# Patient Record
Sex: MALE | NOT HISPANIC OR LATINO | Employment: FULL TIME | URBAN - METROPOLITAN AREA
[De-identification: names, ages, dates, MRNs, and addresses within clinical notes are randomized per-mention and may not be internally consistent; named-entity substitution may affect disease eponyms.]

---

## 2022-07-26 ENCOUNTER — OFFICE VISIT (OUTPATIENT)
Dept: FAMILY MEDICINE | Facility: CLINIC | Age: 32
End: 2022-07-26
Payer: COMMERCIAL

## 2022-07-26 VITALS
WEIGHT: 204.4 LBS | DIASTOLIC BLOOD PRESSURE: 72 MMHG | HEIGHT: 69 IN | SYSTOLIC BLOOD PRESSURE: 110 MMHG | RESPIRATION RATE: 18 BRPM | HEART RATE: 70 BPM | BODY MASS INDEX: 30.27 KG/M2

## 2022-07-26 DIAGNOSIS — Z13.6 CARDIOVASCULAR SCREENING; LDL GOAL LESS THAN 130: ICD-10-CM

## 2022-07-26 DIAGNOSIS — Z82.71 FAMILY HISTORY OF POLYCYSTIC KIDNEY DISEASE: ICD-10-CM

## 2022-07-26 DIAGNOSIS — Z11.3 SCREEN FOR STD (SEXUALLY TRANSMITTED DISEASE): ICD-10-CM

## 2022-07-26 DIAGNOSIS — F17.200 NICOTINE DEPENDENCE, UNCOMPLICATED, UNSPECIFIED NICOTINE PRODUCT TYPE: ICD-10-CM

## 2022-07-26 DIAGNOSIS — Z11.59 NEED FOR HEPATITIS C SCREENING TEST: ICD-10-CM

## 2022-07-26 DIAGNOSIS — Z11.4 SCREENING FOR HIV (HUMAN IMMUNODEFICIENCY VIRUS): ICD-10-CM

## 2022-07-26 DIAGNOSIS — Z00.00 ROUTINE GENERAL MEDICAL EXAMINATION AT A HEALTH CARE FACILITY: Primary | ICD-10-CM

## 2022-07-26 LAB
ANION GAP SERPL CALCULATED.3IONS-SCNC: 5 MMOL/L (ref 3–14)
BUN SERPL-MCNC: 11 MG/DL (ref 7–30)
CALCIUM SERPL-MCNC: 9.1 MG/DL (ref 8.5–10.1)
CHLORIDE BLD-SCNC: 105 MMOL/L (ref 94–109)
CHOLEST SERPL-MCNC: 181 MG/DL
CO2 SERPL-SCNC: 29 MMOL/L (ref 20–32)
CREAT SERPL-MCNC: 0.98 MG/DL (ref 0.66–1.25)
FASTING STATUS PATIENT QL REPORTED: NO
GFR SERPL CREATININE-BSD FRML MDRD: >90 ML/MIN/1.73M2
GLUCOSE BLD-MCNC: 84 MG/DL (ref 70–99)
HDLC SERPL-MCNC: 57 MG/DL
LDLC SERPL CALC-MCNC: 77 MG/DL
NONHDLC SERPL-MCNC: 124 MG/DL
POTASSIUM BLD-SCNC: 4 MMOL/L (ref 3.4–5.3)
SODIUM SERPL-SCNC: 139 MMOL/L (ref 133–144)
TRIGL SERPL-MCNC: 236 MG/DL

## 2022-07-26 PROCEDURE — 80048 BASIC METABOLIC PNL TOTAL CA: CPT | Performed by: PHYSICIAN ASSISTANT

## 2022-07-26 PROCEDURE — 86780 TREPONEMA PALLIDUM: CPT | Performed by: PHYSICIAN ASSISTANT

## 2022-07-26 PROCEDURE — 87591 N.GONORRHOEAE DNA AMP PROB: CPT | Performed by: PHYSICIAN ASSISTANT

## 2022-07-26 PROCEDURE — 36415 COLL VENOUS BLD VENIPUNCTURE: CPT | Performed by: PHYSICIAN ASSISTANT

## 2022-07-26 PROCEDURE — 86803 HEPATITIS C AB TEST: CPT | Performed by: PHYSICIAN ASSISTANT

## 2022-07-26 PROCEDURE — 87389 HIV-1 AG W/HIV-1&-2 AB AG IA: CPT | Performed by: PHYSICIAN ASSISTANT

## 2022-07-26 PROCEDURE — 99406 BEHAV CHNG SMOKING 3-10 MIN: CPT | Performed by: PHYSICIAN ASSISTANT

## 2022-07-26 PROCEDURE — 80061 LIPID PANEL: CPT | Performed by: PHYSICIAN ASSISTANT

## 2022-07-26 PROCEDURE — 99385 PREV VISIT NEW AGE 18-39: CPT | Performed by: PHYSICIAN ASSISTANT

## 2022-07-26 PROCEDURE — 87491 CHLMYD TRACH DNA AMP PROBE: CPT | Performed by: PHYSICIAN ASSISTANT

## 2022-07-26 RX ORDER — POLYETHYLENE GLYCOL 3350 17 G
POWDER IN PACKET (EA) ORAL
Qty: 108 LOZENGE | Refills: 5 | Status: SHIPPED | OUTPATIENT
Start: 2022-07-26 | End: 2024-04-10

## 2022-07-26 RX ORDER — NICOTINE 21 MG/24HR
1 PATCH, TRANSDERMAL 24 HOURS TRANSDERMAL EVERY 24 HOURS
Qty: 42 PATCH | Refills: 1 | Status: SHIPPED | OUTPATIENT
Start: 2022-07-26 | End: 2022-09-06

## 2022-07-26 ASSESSMENT — ENCOUNTER SYMPTOMS
COUGH: 0
ARTHRALGIAS: 1
FREQUENCY: 0
EYE PAIN: 0
NERVOUS/ANXIOUS: 1
HEMATURIA: 0
CONSTIPATION: 0
HEADACHES: 0
FEVER: 0
PALPITATIONS: 0
JOINT SWELLING: 0
SHORTNESS OF BREATH: 0
PARESTHESIAS: 0
HEMATOCHEZIA: 0
SORE THROAT: 0
HEARTBURN: 0
WEAKNESS: 1
DYSURIA: 0
DIARRHEA: 1
CHILLS: 0
MYALGIAS: 1
ABDOMINAL PAIN: 0
NAUSEA: 0
DIZZINESS: 0

## 2022-07-26 ASSESSMENT — PAIN SCALES - GENERAL: PAINLEVEL: MILD PAIN (3)

## 2022-07-26 NOTE — PROGRESS NOTES
SUBJECTIVE:   CC: Que Justin is an 32 year old male who presents for preventative health visit.     Patient has been advised of split billing requirements and indicates understanding: Yes     Healthy Habits:     Getting at least 3 servings of Calcium per day:  Yes    Bi-annual eye exam:  Yes    Dental care twice a year:  Yes    Sleep apnea or symptoms of sleep apnea:  Daytime drowsiness and Excessive snoring    Diet:  Regular (no restrictions)    Frequency of exercise:  4-5 days/week    Duration of exercise:  15-30 minutes    Taking medications regularly:  Yes    Medication side effects:  None    PHQ-2 Total Score: 2    Additional concerns today:  Yes    Today's PHQ-2 Score:   PHQ-2 ( 1999 [employer]) 7/26/2022   Q1: Little interest or pleasure in doing things 1   Q2: Feeling down, depressed or hopeless 1   PHQ-2 Score 2   Q1: Little interest or pleasure in doing things Several days   Q2: Feeling down, depressed or hopeless Several days   PHQ-2 Score 2       Abuse: Current or Past(Physical, Sexual or Emotional)- No  Do you feel safe in your environment? Yes    Have you ever done Advance Care Planning? (For example, a Health Directive, POLST, or a discussion with a medical provider or your loved ones about your wishes): No, advance care planning information given to patient to review.  Patient plans to discuss their wishes with loved ones or provider.      Social History     Tobacco Use     Smoking status: Current Every Day Smoker     Types: Cigarettes     Smokeless tobacco: Current User     Types: Chew   Substance Use Topics     Alcohol use: Yes     If you drink alcohol do you typically have >3 drinks per day or >7 drinks per week? No    Alcohol Use 7/26/2022   Prescreen: >3 drinks/day or >7 drinks/week? No   Prescreen: >3 drinks/day or >7 drinks/week? -   No flowsheet data found.    Last PSA: No results found for: PSA    Reviewed orders with patient. Reviewed health maintenance and updated orders accordingly -  "Yes  Lab work is in process    Reviewed and updated as needed this visit by clinical staff   Tobacco  Allergies  Meds  Problems  Med Hx  Surg Hx  Fam Hx  Soc   Hx          Reviewed and updated as needed this visit by Provider   Tobacco  Allergies  Meds  Problems  Med Hx  Surg Hx  Fam Hx             Review of Systems   Constitutional: Negative for chills and fever.   HENT: Negative for congestion, ear pain, hearing loss and sore throat.    Eyes: Negative for pain and visual disturbance.   Respiratory: Negative for cough and shortness of breath.    Cardiovascular: Negative for chest pain, palpitations and peripheral edema.   Gastrointestinal: Positive for diarrhea. Negative for abdominal pain, constipation, heartburn, hematochezia and nausea.   Genitourinary: Negative for dysuria, frequency, genital sores, hematuria, impotence, penile discharge and urgency.   Musculoskeletal: Positive for arthralgias and myalgias. Negative for joint swelling.   Skin: Negative for rash.   Neurological: Positive for weakness. Negative for dizziness, headaches and paresthesias.   Psychiatric/Behavioral: Negative for mood changes. The patient is nervous/anxious.      OBJECTIVE:   /72 (BP Location: Right arm, Patient Position: Sitting, Cuff Size: Adult Large)   Pulse 70   Resp 18   Ht 1.753 m (5' 9\")   Wt 92.7 kg (204 lb 6.4 oz)   BMI 30.18 kg/m      Physical Exam  Constitutional: healthy, alert, and no distress  Head: Normocephalic. Atraumatic  Eyes: No conjunctival injection, sclera anicteric  Neck: supple, no thyromegaly, nodules or asymmetry of the thyroid. No cervical LAD.  Cardiovascular: RRR. No murmurs, clicks, gallops, or rubs. No peripheral edema.   Respiratory: No resp distress. Lungs CTAB bilaterally.   Musculoskeletal: extremities normal- no gross deformities noted, and normal muscle tone  Skin: no suspicious lesions or rashes  Neurologic: Gait normal. CN 2-12 grossly intact  Psychiatric: mentation " "appears normal and affect normal/bright     ASSESSMENT/PLAN:   Routine general medical examination at a health care facility  32 yr old here for physical exam. Discussed preventative screenings which are updated below. Counseled on immunizations and healthy lifestyle. Follow-up in 1 year for repeat physical exam.    Nicotine dependence, uncomplicated, unspecified nicotine product type  Would like to stop smoking. Counseled. Will trial Nicotine replacement. Follow-up in 3 months for recheck.   - nicotine (NICODERM CQ) 14 MG/24HR 24 hr patch; Place 1 patch onto the skin every 24 hours for 42 days  - nicotine (NICODERM CQ) 7 MG/24HR 24 hr patch; Place 1 patch onto the skin every 24 hours for 14 days  - nicotine (NICORETTE) 2 MG gum; How to take it: When the urge to smoke occurs, chew gum until you feel a tingle, then \"park\" the gum in your cheek until the tingle is gone. Re-chew every few minutes and \"park\" again, chewing one piece for 30 minutes. Do not eat or drink while chewing.  Follow this schedule: Weeks 1 to 6: One piece of gum every 1 to 2 hours. Use at least 9 pieces a day, but no more than 24. Weeks 7 to 9: One piece of gum every 2 to 4 hours. Weeks 10 to 12: One piece of gum every 4 to 8 hours.  - nicotine (COMMIT) 2 MG lozenge; How to take it: When the urge to smoke occurs, suck (do not chew) on 1 lozenge to release nicotine. Do not eat or drink while the lozenge is in your mouth. Follow this schedule: Weeks 1 to 6: One lozenge every 1 to 2 hours. Use at least 9 lozenges a day, but no more than 20. Weeks 7 to 9: One lozenge every 2 to 4 hours. Weeks 10 to 12: One lozenge every 4 to 8 hours  - SMOKING CESSATION COUNSELING 3-10 MIN    CARDIOVASCULAR SCREENING; LDL GOAL LESS THAN 130  Due for screening. Ordered today.   - Lipid panel reflex to direct LDL Fasting; Future    Screen for STD (sexually transmitted disease)  Requested screening. Ordered today.   - NEISSERIA GONORRHOEA PCR  - CHLAMYDIA TRACHOMATIS " "PCR  - Treponema Abs w Reflex to RPR and Titer; Future    Screening for HIV (human immunodeficiency virus)  Due for screening. Ordered today.   - HIV Antigen Antibody Combo; Future    Need for hepatitis C screening test  Due for screening. Ordered today.   - Hepatitis C Screen Reflex to HCV RNA Quant and Genotype; Future    Family history of polycystic kidney disease  No hypertension here. Will check BMP today. Consider ultrasound in the future.  - Basic metabolic panel  (Ca, Cl, CO2, Creat, Gluc, K, Na, BUN); Future    Patient has been advised of split billing requirements and indicates understanding: Yes    COUNSELING:   Reviewed preventive health counseling, as reflected in patient instructions       Regular exercise       Healthy diet/nutrition       Vision screening    Estimated body mass index is 30.18 kg/m  as calculated from the following:    Height as of this encounter: 1.753 m (5' 9\").    Weight as of this encounter: 92.7 kg (204 lb 6.4 oz).     Weight management plan: Discussed healthy diet and exercise guidelines     He reports that he has been smoking cigarettes. His smokeless tobacco use includes chew.    COLEMAN Avendaño [facility]  "

## 2022-07-27 LAB
C TRACH DNA SPEC QL NAA+PROBE: NEGATIVE
HCV AB SERPL QL IA: NONREACTIVE
HIV 1+2 AB+HIV1 P24 AG SERPL QL IA: NONREACTIVE
N GONORRHOEA DNA SPEC QL NAA+PROBE: NEGATIVE
T PALLIDUM AB SER QL: NONREACTIVE

## 2022-12-14 ENCOUNTER — OFFICE VISIT (OUTPATIENT)
Dept: FAMILY MEDICINE | Facility: CLINIC | Age: 32
End: 2022-12-14
Payer: COMMERCIAL

## 2022-12-14 VITALS
OXYGEN SATURATION: 97 % | HEART RATE: 67 BPM | SYSTOLIC BLOOD PRESSURE: 112 MMHG | BODY MASS INDEX: 32.11 KG/M2 | RESPIRATION RATE: 16 BRPM | DIASTOLIC BLOOD PRESSURE: 82 MMHG | WEIGHT: 216.8 LBS | TEMPERATURE: 97.5 F | HEIGHT: 69 IN

## 2022-12-14 DIAGNOSIS — M41.9 SCOLIOSIS OF LUMBAR SPINE, UNSPECIFIED SCOLIOSIS TYPE: ICD-10-CM

## 2022-12-14 DIAGNOSIS — R10.9 FLANK PAIN: ICD-10-CM

## 2022-12-14 DIAGNOSIS — Z00.00 ANNUAL PHYSICAL EXAM: Primary | ICD-10-CM

## 2022-12-14 DIAGNOSIS — Z82.71 FAMILY HISTORY OF POLYCYSTIC KIDNEY DISEASE: ICD-10-CM

## 2022-12-14 DIAGNOSIS — R06.83 SNORING: ICD-10-CM

## 2022-12-14 DIAGNOSIS — M54.50 CHRONIC BILATERAL LOW BACK PAIN WITHOUT SCIATICA: ICD-10-CM

## 2022-12-14 DIAGNOSIS — N50.89 TESTICULAR LUMP: ICD-10-CM

## 2022-12-14 DIAGNOSIS — Z86.59 HISTORY OF ADHD: ICD-10-CM

## 2022-12-14 DIAGNOSIS — G89.29 CHRONIC BILATERAL LOW BACK PAIN WITHOUT SCIATICA: ICD-10-CM

## 2022-12-14 LAB
ALBUMIN UR-MCNC: NEGATIVE MG/DL
ANION GAP SERPL CALCULATED.3IONS-SCNC: 12 MMOL/L (ref 7–15)
APPEARANCE UR: CLEAR
BACTERIA #/AREA URNS HPF: ABNORMAL /HPF
BILIRUB UR QL STRIP: NEGATIVE
BUN SERPL-MCNC: 13.4 MG/DL (ref 6–20)
CALCIUM SERPL-MCNC: 9.6 MG/DL (ref 8.6–10)
CHLORIDE SERPL-SCNC: 102 MMOL/L (ref 98–107)
CHOLEST SERPL-MCNC: 203 MG/DL
COLOR UR AUTO: YELLOW
CREAT SERPL-MCNC: 1.01 MG/DL (ref 0.67–1.17)
CREAT UR-MCNC: 139.3 MG/DL
DEPRECATED HCO3 PLAS-SCNC: 26 MMOL/L (ref 22–29)
ERYTHROCYTE [DISTWIDTH] IN BLOOD BY AUTOMATED COUNT: 11.6 % (ref 10–15)
GFR SERPL CREATININE-BSD FRML MDRD: >90 ML/MIN/1.73M2
GLUCOSE SERPL-MCNC: 86 MG/DL (ref 70–99)
GLUCOSE UR STRIP-MCNC: NEGATIVE MG/DL
HCT VFR BLD AUTO: 46.8 % (ref 40–53)
HDLC SERPL-MCNC: 73 MG/DL
HGB BLD-MCNC: 15.7 G/DL (ref 13.3–17.7)
HGB UR QL STRIP: NEGATIVE
KETONES UR STRIP-MCNC: NEGATIVE MG/DL
LDLC SERPL CALC-MCNC: 110 MG/DL
LEUKOCYTE ESTERASE UR QL STRIP: NEGATIVE
MCH RBC QN AUTO: 31.4 PG (ref 26.5–33)
MCHC RBC AUTO-ENTMCNC: 33.5 G/DL (ref 31.5–36.5)
MCV RBC AUTO: 94 FL (ref 78–100)
MICROALBUMIN UR-MCNC: <12 MG/L
MICROALBUMIN/CREAT UR: NORMAL MG/G{CREAT}
MUCOUS THREADS #/AREA URNS LPF: PRESENT /LPF
NITRATE UR QL: NEGATIVE
NONHDLC SERPL-MCNC: 130 MG/DL
PH UR STRIP: 6 [PH] (ref 5–7)
PLATELET # BLD AUTO: 203 10E3/UL (ref 150–450)
POTASSIUM SERPL-SCNC: 4.3 MMOL/L (ref 3.4–5.3)
RBC # BLD AUTO: 5 10E6/UL (ref 4.4–5.9)
RBC #/AREA URNS AUTO: ABNORMAL /HPF
SODIUM SERPL-SCNC: 140 MMOL/L (ref 136–145)
SP GR UR STRIP: 1.02 (ref 1–1.03)
SQUAMOUS #/AREA URNS AUTO: ABNORMAL /LPF
TRIGL SERPL-MCNC: 102 MG/DL
UROBILINOGEN UR STRIP-ACNC: 0.2 E.U./DL
WBC # BLD AUTO: 7.1 10E3/UL (ref 4–11)
WBC #/AREA URNS AUTO: ABNORMAL /HPF

## 2022-12-14 PROCEDURE — 85027 COMPLETE CBC AUTOMATED: CPT | Performed by: NURSE PRACTITIONER

## 2022-12-14 PROCEDURE — 81001 URINALYSIS AUTO W/SCOPE: CPT | Performed by: NURSE PRACTITIONER

## 2022-12-14 PROCEDURE — 36415 COLL VENOUS BLD VENIPUNCTURE: CPT | Performed by: NURSE PRACTITIONER

## 2022-12-14 PROCEDURE — 80061 LIPID PANEL: CPT | Performed by: NURSE PRACTITIONER

## 2022-12-14 PROCEDURE — 99214 OFFICE O/P EST MOD 30 MIN: CPT | Mod: 25 | Performed by: NURSE PRACTITIONER

## 2022-12-14 PROCEDURE — 82043 UR ALBUMIN QUANTITATIVE: CPT | Performed by: NURSE PRACTITIONER

## 2022-12-14 PROCEDURE — 80048 BASIC METABOLIC PNL TOTAL CA: CPT | Performed by: NURSE PRACTITIONER

## 2022-12-14 PROCEDURE — 99395 PREV VISIT EST AGE 18-39: CPT | Performed by: NURSE PRACTITIONER

## 2022-12-14 RX ORDER — CYCLOBENZAPRINE HCL 5 MG
5 TABLET ORAL 3 TIMES DAILY PRN
Qty: 30 TABLET | Refills: 3 | Status: SHIPPED | OUTPATIENT
Start: 2022-12-14 | End: 2024-02-26

## 2022-12-14 ASSESSMENT — PAIN SCALES - GENERAL: PAINLEVEL: NO PAIN (0)

## 2022-12-14 NOTE — LETTER
December 15, 2022      Que Justin  [address] [facility] [address]        Dear ,    We are writing to inform you of your test results.      1. Urine protein level normal   2. Urine is normal and negative for kidney infection   3. Kidney function, electrolytes and glucose level all normal   4. Blood count normal   5. Cholesterol level was mildly elevated, recommend to follow low fat diet and try to exercise daily.     Resulted Orders   Basic metabolic panel  (Ca, Cl, CO2, Creat, Gluc, K, Na, BUN)   Result Value Ref Range    Sodium 140 136 - 145 mmol/L    Potassium 4.3 3.4 - 5.3 mmol/L    Chloride 102 98 - 107 mmol/L    Carbon Dioxide (CO2) 26 22 - 29 mmol/L    Anion Gap 12 7 - 15 mmol/L    Urea Nitrogen 13.4 6.0 - 20.0 mg/dL    Creatinine 1.01 0.67 - 1.17 mg/dL    Calcium 9.6 8.6 - 10.0 mg/dL    Glucose 86 70 - 99 mg/dL    GFR Estimate >90 >60 mL/min/1.73m2      Comment:      Effective December 21, 2021 eGFRcr in adults is calculated using the 2021 CKD-EPI creatinine equation which includes age and gender (Martin et al., NEJM, DOI: 10.1056/HCLWyc0402580)   Albumin Random Urine Quantitative with Creat Ratio   Result Value Ref Range    Albumin Urine mg/L <12.0 mg/L      Comment:      The reference ranges have not been established in urine albumin. The results should be integrated into the clinical context for interpretation.    Albumin Urine mg/g Cr        Comment:      Unable to calculate, urine albumin and/or urine creatinine is outside detectable limits.  Microalbuminuria is defined as an albumin:creatinine ratio of 17 to 299 for males and 25 to 299 for females. A ratio of albumin:creatinine of 300 or higher is indicative of overt proteinuria.  Due to biologic variability, positive results should be confirmed by a second, first-morning random or 24-hour timed urine specimen. If there is discrepancy, a third specimen is recommended. When 2 out of 3 results are in the microalbuminuria range, this is  evidence for incipient nephropathy and warrants increased efforts at glucose control, blood pressure control, and institution of therapy with an angiotensin-converting-enzyme (ACE) inhibitor (if the patient can tolerate it).      Creatinine Urine mg/dL 139.3 mg/dL      Comment:      The reference ranges have not been established in urine creatinine. The results should be integrated into the clinical context for interpretation.   UA with Microscopic reflex to Culture - lab collect   Result Value Ref Range    Color Urine Yellow Colorless, Straw, Light Yellow, Yellow    Appearance Urine Clear Clear    Glucose Urine Negative Negative mg/dL    Bilirubin Urine Negative Negative    Ketones Urine Negative Negative mg/dL    Specific Gravity Urine 1.025 1.003 - 1.035    Blood Urine Negative Negative    pH Urine 6.0 5.0 - 7.0    Protein Albumin Urine Negative Negative mg/dL    Urobilinogen Urine 0.2 0.2, 1.0 E.U./dL    Nitrite Urine Negative Negative    Leukocyte Esterase Urine Negative Negative   CBC with platelets   Result Value Ref Range    WBC Count 7.1 4.0 - 11.0 10e3/uL    RBC Count 5.00 4.40 - 5.90 10e6/uL    Hemoglobin 15.7 13.3 - 17.7 g/dL    Hematocrit 46.8 40.0 - 53.0 %    MCV 94 78 - 100 fL    MCH 31.4 26.5 - 33.0 pg    MCHC 33.5 31.5 - 36.5 g/dL    RDW 11.6 10.0 - 15.0 %    Platelet Count 203 150 - 450 10e3/uL   Lipid panel reflex to direct LDL Fasting   Result Value Ref Range    Cholesterol 203 (H) <200 mg/dL    Triglycerides 102 <150 mg/dL    Direct Measure HDL 73 >=40 mg/dL    LDL Cholesterol Calculated 110 (H) <=100 mg/dL    Non HDL Cholesterol 130 (H) <130 mg/dL    Narrative    Cholesterol  Desirable:  <200 mg/dL    Triglycerides  Normal:  Less than 150 mg/dL  Borderline High:  150-199 mg/dL  High:  200-499 mg/dL  Very High:  Greater than or equal to 500 mg/dL    Direct Measure HDL  Female:  Greater than or equal to 50 mg/dL   Male:  Greater than or equal to 40 mg/dL    LDL Cholesterol  Desirable:   <100mg/dL  Above Desirable:  100-129 mg/dL   Borderline High:  130-159 mg/dL   High:  160-189 mg/dL   Very High:  >= 190 mg/dL    Non HDL Cholesterol  Desirable:  130 mg/dL  Above Desirable:  130-159 mg/dL  Borderline High:  160-189 mg/dL  High:  190-219 mg/dL  Very High:  Greater than or equal to 220 mg/dL   Urine Microscopic   Result Value Ref Range    Bacteria Urine Few (A) None Seen /HPF    RBC Urine 0-2 0-2 /HPF /HPF    WBC Urine 0-5 0-5 /HPF /HPF    Squamous Epithelials Urine Few (A) None Seen /LPF    Mucus Urine Present (A) None Seen /LPF    Narrative    Urine Culture not indicated       If you have any questions or concerns, please call the clinic at the number listed above.       Sincerely,      LOY Telles CNP
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
## 2022-12-17 ENCOUNTER — HOSPITAL ENCOUNTER (OUTPATIENT)
Dept: ULTRASOUND IMAGING | Facility: CLINIC | Age: 32
Discharge: HOME | End: 2022-12-17
Attending: NURSE PRACTITIONER
Payer: COMMERCIAL

## 2022-12-17 DIAGNOSIS — R10.9 FLANK PAIN: ICD-10-CM

## 2022-12-17 DIAGNOSIS — Z82.71 FAMILY HISTORY OF POLYCYSTIC KIDNEY DISEASE: ICD-10-CM

## 2022-12-17 DIAGNOSIS — N50.89 TESTICULAR LUMP: ICD-10-CM

## 2022-12-17 PROCEDURE — 76770 US EXAM ABDO BACK WALL COMP: CPT

## 2022-12-17 PROCEDURE — 76870 US EXAM SCROTUM: CPT

## 2023-01-09 ENCOUNTER — HOSPITAL ENCOUNTER (OUTPATIENT)
Dept: PHYSICAL THERAPY | Facility: CLINIC | Age: 33
Setting detail: THERAPIES SERIES
Discharge: HOME | End: 2023-01-09
Attending: NURSE PRACTITIONER
Payer: COMMERCIAL

## 2023-01-09 DIAGNOSIS — M54.50 CHRONIC BILATERAL LOW BACK PAIN WITHOUT SCIATICA: ICD-10-CM

## 2023-01-09 DIAGNOSIS — M41.9 SCOLIOSIS OF LUMBAR SPINE, UNSPECIFIED SCOLIOSIS TYPE: ICD-10-CM

## 2023-01-09 DIAGNOSIS — G89.29 CHRONIC BILATERAL LOW BACK PAIN WITHOUT SCIATICA: ICD-10-CM

## 2023-01-09 PROCEDURE — 97161 PT EVAL LOW COMPLEX 20 MIN: CPT | Mod: GP | Performed by: PHYSICAL THERAPIST

## 2023-01-09 PROCEDURE — 97530 THERAPEUTIC ACTIVITIES: CPT | Mod: GP | Performed by: PHYSICAL THERAPIST

## 2023-01-09 PROCEDURE — 97110 THERAPEUTIC EXERCISES: CPT | Mod: GP | Performed by: PHYSICAL THERAPIST
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
## 2023-02-12 ENCOUNTER — HEALTH MAINTENANCE LETTER (OUTPATIENT)
Age: 33
End: 2023-02-12

## 2023-03-29 NOTE — PROGRESS NOTES
Physical Therapy Discharge Summary    Que Justin has completed the ordered physical therapy evaluation. Treatment recommendations were made, but pt has not returned for any further recommended PT sessions past the initial evaluation.  Pt was unable to be re-assessed, and present status is unknown.    Please contact me with any questions or concerns.  Thank you for your referral.    Tena Montalvo  PT, DPT       3/29/2023   [address] [address]   [email or web address]  Voicemail: [phone]

## 2023-05-03 ASSESSMENT — ANXIETY QUESTIONNAIRES
7. FEELING AFRAID AS IF SOMETHING AWFUL MIGHT HAPPEN: MORE THAN HALF THE DAYS
GAD7 TOTAL SCORE: 14
4. TROUBLE RELAXING: MORE THAN HALF THE DAYS
2. NOT BEING ABLE TO STOP OR CONTROL WORRYING: MORE THAN HALF THE DAYS
7. FEELING AFRAID AS IF SOMETHING AWFUL MIGHT HAPPEN: MORE THAN HALF THE DAYS
3. WORRYING TOO MUCH ABOUT DIFFERENT THINGS: MORE THAN HALF THE DAYS
1. FEELING NERVOUS, ANXIOUS, OR ON EDGE: MORE THAN HALF THE DAYS
6. BECOMING EASILY ANNOYED OR IRRITABLE: MORE THAN HALF THE DAYS
GAD7 TOTAL SCORE: 14
GAD7 TOTAL SCORE: 14
8. IF YOU CHECKED OFF ANY PROBLEMS, HOW DIFFICULT HAVE THESE MADE IT FOR YOU TO DO YOUR WORK, TAKE CARE OF THINGS AT HOME, OR GET ALONG WITH OTHER PEOPLE?: NOT DIFFICULT AT ALL
5. BEING SO RESTLESS THAT IT IS HARD TO SIT STILL: MORE THAN HALF THE DAYS
IF YOU CHECKED OFF ANY PROBLEMS ON THIS QUESTIONNAIRE, HOW DIFFICULT HAVE THESE PROBLEMS MADE IT FOR YOU TO DO YOUR WORK, TAKE CARE OF THINGS AT HOME, OR GET ALONG WITH OTHER PEOPLE: NOT DIFFICULT AT ALL

## 2023-05-03 ASSESSMENT — PATIENT HEALTH QUESTIONNAIRE - PHQ9
SUM OF ALL RESPONSES TO PHQ QUESTIONS 1-9: 8
10. IF YOU CHECKED OFF ANY PROBLEMS, HOW DIFFICULT HAVE THESE PROBLEMS MADE IT FOR YOU TO DO YOUR WORK, TAKE CARE OF THINGS AT HOME, OR GET ALONG WITH OTHER PEOPLE: SOMEWHAT DIFFICULT
SUM OF ALL RESPONSES TO PHQ QUESTIONS 1-9: 8

## 2023-05-04 ENCOUNTER — VIRTUAL VISIT (OUTPATIENT)
Dept: PSYCHOLOGY | Facility: CLINIC | Age: 33
End: 2023-05-04
Payer: COMMERCIAL

## 2023-05-04 DIAGNOSIS — F41.1 GENERALIZED ANXIETY DISORDER: Primary | ICD-10-CM

## 2023-05-04 PROCEDURE — 90791 PSYCH DIAGNOSTIC EVALUATION: CPT | Mod: VID | Performed by: PSYCHOLOGIST

## 2023-05-04 ASSESSMENT — COLUMBIA-SUICIDE SEVERITY RATING SCALE - C-SSRS
1. HAVE YOU WISHED YOU WERE DEAD OR WISHED YOU COULD GO TO SLEEP AND NOT WAKE UP?: NO
6. HAVE YOU EVER DONE ANYTHING, STARTED TO DO ANYTHING, OR PREPARED TO DO ANYTHING TO END YOUR LIFE?: NO
ATTEMPT LIFETIME: NO
TOTAL  NUMBER OF ABORTED OR SELF INTERRUPTED ATTEMPTS LIFETIME: NO
2. HAVE YOU ACTUALLY HAD ANY THOUGHTS OF KILLING YOURSELF?: NO
TOTAL  NUMBER OF INTERRUPTED ATTEMPTS LIFETIME: NO
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
## 2023-05-12 ENCOUNTER — VIRTUAL VISIT (OUTPATIENT)
Dept: PSYCHOLOGY | Facility: CLINIC | Age: 33
End: 2023-05-12
Payer: COMMERCIAL

## 2023-05-12 DIAGNOSIS — F41.1 GENERALIZED ANXIETY DISORDER: Primary | ICD-10-CM

## 2023-05-12 PROCEDURE — 90832 PSYTX W PT 30 MINUTES: CPT | Mod: VID | Performed by: PSYCHOLOGIST

## 2023-05-12 NOTE — PROGRESS NOTES
Progress Note     Client Name:  Que Justin Date: 5/12/2023         Service Type: Individual    Telemedicine Visit: The patient's condition can be safely assessed and treated via synchronous audio and visual telemedicine encounter.      Reason for Telemedicine Visit: Services only offered telehealth    Originating Site (Patient Location): Patient's place of employment        Distant Location (provider location):  Off-site    Consent:  The patient/guardian has verbally consented to: the potential risks and benefits of telemedicine (video visit) versus in person care; bill my insurance or make self-payment for services provided; and responsibility for payment of non-covered services.     Mode of Communication:  Video Conference via Digital Lab    As the provider I attest to compliance with applicable laws and regulations related to telemedicine.         Session Start Time: 1:00  Session End Time: 1:20     Session Length: 20 minutes    Session #: 2     Attendees: Client attended alone     Intervention: reviewed strategies for managing anxiety; motivational interviewing: explored potential barriers for making healthy changes    Identifying Information:  Client is a 33 year old, , partnered / significant other male. Client was referred for a diagnostic assessment by PCP.  The purpose of this evaluation is to: provide treatment recommendations and clarify diagnosis.  Client is currently employed full time and reports he is able to function appropriately at work.. Client attended the session alone.       Client's Statement of Presenting Concern:  Client reported seeking services at this time for diagnostic assessment and recommendations for treatment. Client's presenting concerns include: He is fidgety and bites his nails. He can't stay focused in conversations. He is pretty organized. He can be forgetful so he has to make lists. He has to ask people to repeat themselves. It can be hard to listen. He has been  "told that he interrupts people (he doesn't notice this).  He likes to be active and busy. It is hard to sit still and relax at times (he likes to be \"go, go, go\" at work). Client starts a lot of things and bounces around from one thing to the next without finishing things (this happens at home). He will get side-tracked. He feels easily distracted. He usually has his phone out while watching TV. He will get up and move around too much. Client stated that symptoms have resulted in the following functional impairments: academic performance, management of the household and or completion of tasks and social interactions.             History of Presenting Concern:  Client reported that he has not completed a previous ADHD diagnostic assessment. Patient has received a previous diagnosis of 14. Patient reported that medication has been prescribed to address these problems.  Patient reported that medication was helpful and did not cause unpleasant side effects. He briefly took medication for ADHD but insurance changed and stopped covering it. He only took it for a year. He remembered it being helpful. Client reported that medication has been prescribed to address these problems.  Patient reported that medication was helpful and did not cause unpleasant side effects. Client reported that these problem(s) began in childhood. Client has attempted to resolve these concerns in the past through medication management. Client reported that other professional(s) are not involved in providing support / services.       Social History:  As a child, client reported that he failed to complete assigned chores in the home environment, forgot school work or other items between home and school and needed frequent reminders by parents to be motivated or to complete work. Client reported no difficulty with childhood peer relationships.  As a child, client reported having regular and consistent sleep patterns.  Client reported currently " "experiencing sleep disturbance, including: waking up in the night (tossing and turning due to back pain).  Client reported sleeping approximately 6-7 hours per night.  Client reported that he has not completed a sleep study.  Client reported having a well balanced diet.  There are not significant nutritional concerns.  Client reported sporadic exercise patterns.      Client's highest education level was high school graduate. Client graduated high school in 2010. He estimated he obtained mostly Cs. During the elementary, middle, and high school years, patient recalls academic strengths in the area of \"hands on\" activities and shop class. Client reported experiencing academic problems in reading, writing and math. Client did identify the following learning problems: attention and concentration. He always had difficulty focusing. He was able to read, but he would read a paragraph and not remember what he had read (he was too focused on the words and missed the content).  Client did receive tutoring services during the school years. Client did receive special education services. He was able to take tests individually in a separate room. He was given extended time on tests. Client reported failure to finish or complete homework. He was able to get help with getting his homework done at school so that he didn't have to bring anything home. He was often daydreaming in class. It was difficult to pay attention. Client did attend post-secondary school. He went to [employer]. He was there for under two years. He stated, \"I gave up because I placed so low that I was basically paying out of pocket for high school classes.\" He obtained mostly Cs in college.       Client reported that he is currently employed full-time. They get laid off in the chacko. 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 He has been at his job for 7 years. He pushes dirt and wants things to be perfect.  Client reported that the current job is a good fit " for his skills and personality.  Client reported that he frequently made mistakes with poor attention to detail, often felt bored, disorganized behavior and distractible behavior. He can lose track of time talking with other co-workers. The client's work history includes: [employer] after high school (2 years).  The longest period of employment has been 7 years.  Client has not been terminated from a place of employment.       Risk Taking Behaviors:  Client reported no history of risk taking behaviors      Motor Vehicle Operation:  Client has received a 's license.  Client has received moving violations, including: one speeding tickets and one DUI.  Client reported the following driving habits: attentive and cautious.  According to client, other people are comfortable riding as a passenger when he is driving.        Mental Status Assessment:  Appearance:   Unable to see video  Eye Contact:   Unable to see video  Psychomotor Behavior: Unable to see video  Attitude:   Cooperative   Orientation:   All  Speech   Rate / Production: Normal    Volume:  Normal   Mood:    Normal  Affect:    Appropriate   Thought Content:  Clear   Thought Form:  Coherent  Logical   Insight:    Good       Review of Symptoms:  Depression:     Change in sleep, Lack of interest, Change in energy level, Difficulties concentrating and Change in appetite  Shelbi:             No Symptoms  Psychosis:       No Symptoms  Anxiety:           Excessive worry, Nervousness, Sleep disturbance, Psychomotor agitation and Poor concentration  Panic:              No symptoms  Post Traumatic Stress Disorder:  No Symptoms   Eating Disorder:          No Symptoms  ADD / ADHD:              Inattentive, Difficulties listening, Distractibility, Forgetful, Restlessness/fidgety and Hyperactive  Conduct Disorder:       No symptoms  Autism Spectrum Disorder:     No symptoms  Obsessive Compulsive Disorder:       he likes to have things neat  Reckless Behavior: No  "symptoms        Safety Issues and Plan for Safety and Risk Management:  Client denies a history of suicidal ideation, suicide attempts, self-injurious behavior, homicidal ideation, homicidal behavior and and other safety concerns    Client denies current fears or concerns for personal safety.  Client denies current or recent suicidal ideation or behaviors.  Client denies current or recent homicidal ideation or behaviors.  Client denies current or recent self injurious behavior or ideation.  Client denies other safety concerns.  Client reports there are firearms in the house. The firearms are secured in a locked space.  Recommended that patient call 911 or go to the local ED should there be a change in any of these risk factors.        Diagnostic Criteria:  Attention Deficit Hyperactivity Disorder  A) A persistent pattern of inattention and/or hyperactivity-impulsivity that interferes with functioning or development, as characterized by (1) Inattention and/or (2) Hyperactivity and Impulsivity  - Often fails to give close attention to details or makes careless mistakes in schoolwork, at work, or during other activities  - Often has difficulty sustaining attention in tasks or play activities  - Often does not seem to listen when spoken to directly  - Often does not follow through on instructions and fails to finish schoolwork, chores, or duties in the workplace  - Is often easily distractedby extraneous stimuli  - Often fidgets with or taps hands or feet or squirms in seat  - Often leaves seat in situations when remaining seated is expected  - Often runs about or climbs in situationswhere it is inappropriate  - Is often \"on the go,\" acting as if \"driven by a motor\"  - Often talks excessively    Generalized Anxiety Disorder  A. Excessive anxiety and worry about a number of events or activities (such as work or school performance).   B. The person finds it difficult to control the worry.  C. Select 3 or more symptoms " (required for diagnosis). Only one item is required in children.   - Restlessness or feeling keyed up or on edge.    - Being easily fatigued.    - Difficulty concentrating or mind going blank.    - Irritability.    - Muscle tension.    - Sleep disturbance (difficulty falling or staying asleep, or restless unsatisfying sleep).   D. The focus of the anxiety and worry is not confined to features of an Axis I disorder.  E. The anxiety, worry, or physical symptoms cause clinically significant distress or impairment in social, occupational, or other important areas of functioning.   F. The disturbance is not due to the direct physiological effects of a substance (e.g., a drug of abuse, a medication) or a general medical condition (e.g., hyperthyroidism) and does not occur exclusively during a Mood Disorder, a Psychotic Disorder, or a Pervasive Developmental Disorder.    Functional Status:  Client's symptoms have caused reduced functional status in the following areas: academic performance, management of the household and or completion of tasks and social interactions.         DSM-5Diagnoses: (Sustained by DSM5 Criteria Listed Above)    300.02 (F41.1) Generalized Anxiety Disorder.  RULE OUT: ADHD    Attendance Agreement:  Client has signed Attendance Agreement:No: unable to sign via telehealth      Preliminary Plan:  The client reports no currently identified Catholic, ethnic or cultural issues relevant to therapy.     services are not indicated.    Modifications to assist communication are not indicated.    Collaboration / coordination of treatment will be initiated with the following support professionals: primary care physician.    Referral to another professional/service is not indicated at this time..    A Release of Information is not needed at this time.    Client was given self and collaborative rating scales to be completed prior to the next appointment.  Client consented to sending/receiving these  measures via email.   Depression and anxiety rating scales were completed.  A third appointment was not scheduled at this time.     Report to child / adult protection services was NA.    Patient will have open access to their mental health medical record.    Serenity Menjivar, PhD, LP  May 12, 2023

## 2023-05-17 ENCOUNTER — DOCUMENTATION ONLY (OUTPATIENT)
Dept: PSYCHOLOGY | Facility: CLINIC | Age: 33
End: 2023-05-17
Payer: COMMERCIAL

## 2023-05-17 NOTE — PROGRESS NOTES
Name: Que Justin  MRN: [id number]  : 1990    Client completed the [place] Multiphasic Personality Inventory-2 (MMPI-2), a self-report personality inventory, as part of his evaluation. Validity scales indicate that the client responded in an open and consistent manner, resulting in a valid profile. While overreporting is possible, it is also likely that the Client has limited resources for coping with the stresses and demands of daily life. The following results should be interpreted with caution and in light of other information. His profile reflects a high level of emotional distress. Individuals with similar profiles experience depression with tension, anxiety, worry, intrusive thoughts, insecurity, and apprehensiveness. They experience disturbed sleep and problems with attention and concentration. They feel nervous, stressed out, and vulnerable to upset by disappointment and decisions that don t work out. Individuals with similar profiles may ruminate about personal shortcomings, over-anticipate negative outcomes, and overreact to minor problems and mistakes. Individuals with similar profiles may experience self-doubt, reduced occupational performance, problems with concentration, memory, judgment, and decision making. They may also experience vegetative symptoms of depression such as anhedonia, sleep disturbance, and loss of interest, energy and motivation. They feel less capable, less competent, less adequate and less intelligent than others. They may experience a sense of mental failure or decline and the depletion of energy needed to accomplish mental work. Thinking and problem-solving are experienced as effortful and as subject to going off course even when significant effort is made. Thinking may be viewed as impaired or unreliable; they may have a sense that  I can t seem to get my mind right.  Individuals with similar profiles are concerned about their general health status and physical  functioning and have a tendency to develop physical symptoms in response to stress.

## 2023-07-09 ENCOUNTER — MYC MEDICAL ADVICE (OUTPATIENT)
Dept: FAMILY MEDICINE | Facility: CLINIC | Age: 33
End: 2023-07-09
Payer: COMMERCIAL

## 2023-07-23 ENCOUNTER — MYC MEDICAL ADVICE (OUTPATIENT)
Dept: FAMILY MEDICINE | Facility: CLINIC | Age: 33
End: 2023-07-23
Payer: COMMERCIAL

## 2023-11-14 ENCOUNTER — PATIENT OUTREACH (OUTPATIENT)
Dept: CARE COORDINATION | Facility: CLINIC | Age: 33
End: 2023-11-14
Payer: COMMERCIAL

## 2023-11-28 ENCOUNTER — PATIENT OUTREACH (OUTPATIENT)
Dept: CARE COORDINATION | Facility: CLINIC | Age: 33
End: 2023-11-28
Payer: COMMERCIAL

## 2024-01-02 ENCOUNTER — DOCUMENTATION ONLY (OUTPATIENT)
Dept: PSYCHOLOGY | Facility: CLINIC | Age: 34
End: 2024-01-02
Payer: COMMERCIAL

## 2024-01-02 NOTE — PROGRESS NOTES
"Name: Que Justin   MRN: [id number]  : 1990    [facility] Adult ADHD Rating Scale-IV: Self and Other Reports (BAARS-IV)  The BAARS-IV assesses for symptoms of ADHD that are experienced in one's daily life. This assessment measure includes self and collateral rating scales designed to provide information regarding current and childhood symptoms of ADHD including inattention, hyperactivity, and impulsivity. Self-report scores are reported as percentiles. Scores at the 76th-83rd percentile are considered marginal, scores at the 84th-92nd percentile are considered borderline, scores at the 93rd-95th percentile are considered mild, scores at the 96th-98th percentile are considered moderate, and those at the 99th percentile are considered severe. Collateral or \"other\" rating scales are reported as number of symptoms observed in comparison to those reported by the client. Norms and percentile scores are not available for collateral reports.      Current Symptoms Scale--Self Report:   Client completed the self-report inventory of current symptoms. The results indicate that the client's Total ADHD Score was 35 which places them in the 85th percentile for overall ADHD symptoms. In addition, the client endorsed the following occur \"often\" or \"very often\": 1/9 (85th percentile) Inattention symptoms, 1/9 (80th percentile) Hyperactivity/Impulsivity symptoms, and 1/9 (78th percentile) Sluggish Cognitive Tempo symptoms. Overall, the results suggest the client is not reporting symptoms of ADHD at this time.    Current Symptoms Scale--Other Report:  Client's mother completed the collateral report inventory of current symptoms. Based on the collateral contact's observation of symptoms, the client demonstrates the following \"often\" or \"very often\": 5/9 Inattention symptoms, 1/5 Hyperactivity symptoms, 0/4 Impulsivity symptoms, and 2/9 Sluggish Cognitive Tempo symptoms. The client's Total ADHD Score was 43. The collateral- and " "self-report scores are discrepant. His mother noted more symptoms of inattention at this time than Client reported.     Childhood Symptoms Scale--Self-Report:  Client completed the self-report inventory of childhood symptoms. The results indicate that the client's Total ADHD Score was 31 which places them in the 51-75th percentile for overall ADHD symptoms in childhood. In addition, the client endorsed having experienced the following \"often\" or \"very often\": 1/9 (79th percentile) Inattention symptoms and 0/9 (1-75th percentile) Hyperactivity-Impulsivity symptoms. Overall, the results suggest the client did not experience symptoms of ADHD in childhood.    Childhood Symptoms Scale--Other Report:  Client's mother completed the collateral report inventory of childhood symptoms. Based on the collateral contact's recollection of client's childhood symptoms, the client demonstrated the following \"often\" or \"very often\": 8/9 Inattention symptoms and 0/9 Hyperactivity-Impulsivity symptoms. The client's Total ADHD Score was 43. The collateral-report and self-report scores are discrepant. Client's mother reported more symptoms of inattention than Client reported in childhood.     Lynn Functional Impairment Scale: Self and Other Reports (BFIS)  The BFIS is used to assess an individuals' psychosocial impairment in major life/daily activities that may be due to a mental health disorder. This assessment measure includes self and collateral rating scales. Self-report scores are reported as percentiles. Scores at the 76th-83rd percentile are considered marginal, scores at the 84th-92nd percentile are considered borderline, scores at the 93rd-95th percentile are considered mild, scores at the 96th-98th percentile are considered moderate, and those at the 99th percentile are considered severe. Collateral or \"other\" rating scales are reported as number of symptoms observed in comparison to those reported by the client. Norms and " "percentile scores are not available for collateral reports.      Results indicate the client did not identify impairment (scores at or greater than 93rd percentile) in any areas. The client's Mean Impairment Score was 1.2 (1-50th percentile) indicating the client is not reporting impairment in functioning across domains. Client's mother completed the collateral rating scale, which indicated similar results (e.g., Mean Impairment Score of 2.58). She noted impairment in the area of: home-chores.    Lynn Deficits in Executive Functioning Scale (BDEFS)  The BDEFS is a measure used for evaluating dimensions of adult executive functioning in daily life. This assessment measure includes self and collateral rating scales. Self-report scores are reported as percentiles. Scores at the 76th-83rd percentile are considered marginal, scores at the 84th-92nd percentile are considered borderline, scores at the 93rd-95th percentile are considered mild, scores at the 96th-98th percentile are considered moderate, and those at the 99th percentile are considered severe. Collateral or \"other\" rating scales are reported as number of symptoms observed in comparison to those reported by the client. Norms and percentile scores are not available for collateral reports.      Results indicate the client's Total Executive Functioning Score was 151 (51-75th percentile). The ADHD-Executive Functioning Index score was 21 (77th percentile). These scores suggest the client is not reporting deficits in executive functioning. They did not note deficits in any domains. Client's mother completed the collateral report which indicated discrepant results. She noted a deficit in the area of: self-organization/problem-solving.    Generalized Anxiety Disorder Questionnaire (TAMIE-7)  This questionnaire is designed to screen for anxiety in adults. Based on the client's score of 10, they are reporting moderate symptoms of anxiety at this time. Client identified " the following symptoms of anxiety: feeling nervous/anxious/on edge; not being able to stop or control worrying; worrying too much about different things; becoming easily annoyed or irritable; and feeling afraid as if something awful might happen.     Patient Health Questionnaire- 9 (PHQ-9)   This questionnaire is designed to screen for depression in adults. Based on the client's score of 12, they are reporting moderate symptoms of depression at this time. Client identified the following symptoms of depression: little interest or pleasure in doing things; feeling down/depressed/hopeless; trouble falling asleep/staying asleep/sleeping too much; feeling tired or having little energy; poor appetite or overeating; feeling bad about self; poor concentration, and feeling fidgety/restless.

## 2024-01-30 ENCOUNTER — DOCUMENTATION ONLY (OUTPATIENT)
Dept: PSYCHOLOGY | Facility: CLINIC | Age: 34
End: 2024-01-30
Payer: COMMERCIAL

## 2024-01-30 NOTE — PROGRESS NOTES
CNS Vital Signs Neurocognitive Battery   The CNS Vital Signs Neurocognitive Battery is a remotely-administered assessment comprised of seven core subtests to individually measure the patient's verbal memory, visual memory, motor speed, psychomotor speed, reaction time, focus, ability to sustain attention and ability to adapt to changing rules and tasks.        Above average domain scores indicate a standard score (SS) greater than 109 or a Percentile Rank (KS) greater than 74, indicating a high functioning test subject. Average is a SS  or KS 25-74, indicating normal function. Low Average is a SS 80-89 or KS 9-24 indicating a slight deficit or impairment. Below Average is a SS 70-79 or KS 2-8, indicating a moderate level of deficit or impairment. Very Low is a SS less than 70 or a KS less than 2, indicating a deficit and impairment.  Validity Indicator denotes a guideline for representing the possibility of an invalid test or domain score, and can be influenced by patient understanding, effort, or other conditions.     The patient's results are detailed below:      Domain  Standard Score  Percentile  Description  Validity    Neurocognitive Index  82  12 Low Average Y   Composite?Memory?Measure  83 13 Low Average Y   Verbal Memory  80 9 Low Average Y   Visual?Memory  93 32 Average Y   Psychomotor Speed  95 37 Average Y   Reaction Time  70 2 Low Y   Complex Attention  83 13 Low Average Y   Cognitive Flexibility  81 10 Low Average Y   Processing Speed  68 2 Very Low Y   Executive Function  82 12 Low Average Y   Simple Attention  91 27 Average Y   Motor Speed  112 79 Above Average Y      Neurocognitive?Index?(NCI): Measures an average score derived from the domain scores or a general assessment of the overall neurocognitive status of the patient. The patient's NCI score is 82, with a percentile of 12, and falls within the Low Average range.      Composite?Memory: Measures how well subject can recognize, remember,  and retrieve words and geometric figures, and is comprised of the Visual and Verbal Memory domains. The patient's Composite Memory score is 83, with a percentile of 13, and falls within the Low Average range.      Verbal?Memory: Measures how well subject can recognize, remember, and retrieve words. The patient's Verbal Memory score is 80, with a percentile of 9, and falls within the Low Average range.      Visual?Memory: Measures how well subject can recognize, remember and retrieve geometric figures. The patient's Visual Memory score is 93, with a percentile of 32, and falls within the Average range.      Psychomotor?Speed: Measures how well a subject perceives, attends, responds to complex visual-perceptual information and performs simple fine motor coordination, and is comprised of the Motor Speed and Processing Speed indexes. The patient's Psychomotor Speed score is 95, with a percentile of 37, and falls within the Average range.      Reaction?Time: Measures how quickly the subject can react, in milliseconds, to a simple and increasingly complex direction set. The patient's Reaction Time score is 70, with a percentile of 2, and falls within the Low range.      Complex?Attention: Measures the ability to track and respond to a variety of stimuli over lengthy periods of time and/or perform complex mental tasks requiring vigilance quickly and accurately. The patient's Complex Attention score is 83, with a percentile of 13, and falls within the Low Average range.      Cognitive?Flexibility: Measures how well subject is able to adapt to rapidly changing and increasingly complex set of directions and/or to manipulate the information. The patient's Cognitive Flexibility score is 81, with a percentile of 10, and falls within the Low Average range.      Processing?Speed: Measures how well a subject recognizes and processes information i.e., perceiving, attending/responding to incoming information, motor speed, fine motor  coordination, and visual-perceptual ability. The patient's Processing Speed score is 68, with a percentile of 2, and falls within the Very Low range.      Executive?Function: Measures how well a subject recognizes rules, categories, and manages or navigates rapid decision making. The patient's Executive Function score is 82, with a percentile of 12, and falls within the Low Average range.      Simple?Attention: Measures the ability to track and respond to a single defined stimulus over lengthy periods of time while performing vigilance and response inhibition quickly and accurately to a simple task. The patient's Simple Attention score is 91, with a percentile of 27, and falls within the Average range.      Motor?Speed: Measure: Ability to perform simple movements to produce and satisfy an intention towards a manual action and goal. The patient's Motor Speed score is 112, with a percentile of 79, and falls within the Above Average range.

## 2024-02-01 ENCOUNTER — DOCUMENTATION ONLY (OUTPATIENT)
Dept: PSYCHOLOGY | Facility: CLINIC | Age: 34
End: 2024-02-01
Payer: COMMERCIAL

## 2024-02-01 NOTE — PROGRESS NOTES
[facility]   ADHD Evaluation      Patient: Que Justin   YOB: 1990  MRN: [id number]     Date(s) of assessment: Diagnostic Assessment (5/4/23; 5/12/23); MMPI-2 (Administered 5/12/23; Interpreted on 5/12/23); Lynn self-report and collateral measures scored and interpreted (1/2/24); CNS Vital signs (Administered 1/30/24; Interpreted on 1/30/24)     Information about appointment:   Client attended two sessions to aid in determining client's mental health diagnosis or diagnoses and treatment recommendations that best address client concerns. Available medical records were reviewed. There were no previous psychological evaluations for review. A diagnostic assessment was conducted at the initial appointment. Client completed several rating scales to assist in assessing attention-related and other mental health symptoms that may be causing impairments in functioning. Rating scales were also completed by a collateral contact. Personality testing was also completed to aid in diagnostic clarification.   ?   Assessment tools:    Lynn Adult ADHD Rating Scale-IV: Self and Other Reports (BAARS-IV), Lynn Functional Impairment Scale: Self and Other Reports (BFIS), Lynn Deficits in Executive Functioning Scale: Self and Other Reports (BDEFS), Patient Health Questionnaire-9 (PHQ-9), and Generalized Anxiety Disorder-7 (TAMIE-7); [place] Multiphasic Personality Inventory-Second Edition (MMPI-2); CNS Vital Signs *Testing administered remotely    ?   Assessment Results:   Behavioral Observations:   Client arrived to each session on-time. He was pleasant and cooperative at all times. Client did demonstrate significant difficulties with inattention during the sessions. Client had difficulties understanding instructions for completing paperwork and required multiple correspondence to send and re-send packets for completion.  The following results are likely to be an accurate reflection of Client's  "current functioning.      Lynn Adult ADHD Rating Scale-IV: Self and Other Reports (BAARS-IV)  The BAARS-IV assesses for symptoms of ADHD that are experienced in one's daily life. This assessment measure includes self and collateral rating scales designed to provide information regarding current and childhood symptoms of ADHD including inattention, hyperactivity, and impulsivity. Self-report scores are reported as percentiles. Scores at the 76th-83rd percentile are considered marginal, scores at the 84th-92nd percentile are considered borderline, scores at the 93rd-95th percentile are considered mild, scores at the 96th-98th percentile are considered moderate, and those at the 99th percentile are considered severe. Collateral or \"other\" rating scales are reported as number of symptoms observed in comparison to those reported by the client. Norms and percentile scores are not available for collateral reports.      Current Symptoms Scale--Self Report:   Client completed the self-report inventory of current symptoms. The results indicate that the client's Total ADHD Score was 35 which places them in the 85th percentile for overall ADHD symptoms. In addition, the client endorsed the following occur \"often\" or \"very often\": 1/9 (85th percentile) Inattention symptoms, 1/9 (80th percentile) Hyperactivity/Impulsivity symptoms, and 1/9 (78th percentile) Sluggish Cognitive Tempo symptoms. Overall, the results suggest the client is not reporting symptoms of ADHD at this time.     Current Symptoms Scale--Other Report:  Client's mother completed the collateral report inventory of current symptoms. Based on the collateral contact's observation of symptoms, the client demonstrates the following \"often\" or \"very often\": 5/9 Inattention symptoms, 1/5 Hyperactivity symptoms, 0/4 Impulsivity symptoms, and 2/9 Sluggish Cognitive Tempo symptoms. The client's Total ADHD Score was 43. The collateral- and self-report scores are " "discrepant. His mother noted more symptoms of inattention at this time than Client reported.      Childhood Symptoms Scale--Self-Report:  Client completed the self-report inventory of childhood symptoms. The results indicate that the client's Total ADHD Score was 31 which places them in the 51-75th percentile for overall ADHD symptoms in childhood. In addition, the client endorsed having experienced the following \"often\" or \"very often\": 1/9 (79th percentile) Inattention symptoms and 0/9 (1-75th percentile) Hyperactivity-Impulsivity symptoms. Overall, the results suggest the client did not experience symptoms of ADHD in childhood.     Childhood Symptoms Scale--Other Report:  Client's mother completed the collateral report inventory of childhood symptoms. Based on the collateral contact's recollection of client's childhood symptoms, the client demonstrated the following \"often\" or \"very often\": 8/9 Inattention symptoms and 0/9 Hyperactivity-Impulsivity symptoms. The client's Total ADHD Score was 43. The collateral-report and self-report scores are discrepant. Client's mother reported more symptoms of inattention than Client reported in childhood.     Lynn Functional Impairment Scale: Self and Other Reports (BFIS)  The BFIS is used to assess an individuals' psychosocial impairment in major life/daily activities that may be due to a mental health disorder. This assessment measure includes self and collateral rating scales. Self-report scores are reported as percentiles. Scores at the 76th-83rd percentile are considered marginal, scores at the 84th-92nd percentile are considered borderline, scores at the 93rd-95th percentile are considered mild, scores at the 96th-98th percentile are considered moderate, and those at the 99th percentile are considered severe. Collateral or \"other\" rating scales are reported as number of symptoms observed in comparison to those reported by the client. Norms and percentile scores are " "not available for collateral reports.      Results indicate the client did not identify impairment (scores at or greater than 93rd percentile) in any areas. The client's Mean Impairment Score was 1.2 (1-50th percentile) indicating the client is not reporting impairment in functioning across domains. Client's mother completed the collateral rating scale, which indicated similar results (e.g., Mean Impairment Score of 2.58). She noted impairment in the area of: home-chores.     Lynn Deficits in Executive Functioning Scale (BDEFS)  The BDEFS is a measure used for evaluating dimensions of adult executive functioning in daily life. This assessment measure includes self and collateral rating scales. Self-report scores are reported as percentiles. Scores at the 76th-83rd percentile are considered marginal, scores at the 84th-92nd percentile are considered borderline, scores at the 93rd-95th percentile are considered mild, scores at the 96th-98th percentile are considered moderate, and those at the 99th percentile are considered severe. Collateral or \"other\" rating scales are reported as number of symptoms observed in comparison to those reported by the client. Norms and percentile scores are not available for collateral reports.      Results indicate the client's Total Executive Functioning Score was 151 (51-75th percentile). The ADHD-Executive Functioning Index score was 21 (77th percentile). These scores suggest the client is not reporting deficits in executive functioning. They did not note deficits in any domains. Client's mother completed the collateral report which indicated discrepant results. She noted a deficit in the area of: self-organization/problem-solving.     CNS Vital Signs Neurocognitive Battery   The CNS Vital Signs Neurocognitive Battery is a remotely-administered assessment comprised of seven core subtests to individually measure the patient's verbal memory, visual memory, motor speed, psychomotor " speed, reaction time, focus, ability to sustain attention and ability to adapt to changing rules and tasks.        Above average domain scores indicate a standard score (SS) greater than 109 or a Percentile Rank (OK) greater than 74, indicating a high functioning test subject. Average is a SS  or OK 25-74, indicating normal function. Low Average is a SS 80-89 or OK 9-24 indicating a slight deficit or impairment. Below Average is a SS 70-79 or OK 2-8, indicating a moderate level of deficit or impairment. Very Low is a SS less than 70 or a OK less than 2, indicating a deficit and impairment.  Validity Indicator denotes a guideline for representing the possibility of an invalid test or domain score, and can be influenced by patient understanding, effort, or other conditions.     The patient's results are detailed below:      Domain  Standard Score  Percentile  Description  Validity    Neurocognitive Index  82  12 Low Average Y   Composite?Memory?Measure  83 13 Low Average Y   Verbal Memory  80 9 Low Average Y   Visual?Memory  93 32 Average Y   Psychomotor Speed  95 37 Average Y   Reaction Time  70 2 Low Y   Complex Attention  83 13 Low Average Y   Cognitive Flexibility  81 10 Low Average Y   Processing Speed  68 2 Very Low Y   Executive Function  82 12 Low Average Y   Simple Attention  91 27 Average Y   Motor Speed  112 79 Above Average Y      Neurocognitive?Index?(NCI): Measures an average score derived from the domain scores or a general assessment of the overall neurocognitive status of the patient. The patient's NCI score is 82, with a percentile of 12, and falls within the Low Average range.      Composite?Memory: Measures how well subject can recognize, remember, and retrieve words and geometric figures, and is comprised of the Visual and Verbal Memory domains. The patient's Composite Memory score is 83, with a percentile of 13, and falls within the Low Average range.      Verbal?Memory: Measures how well  subject can recognize, remember, and retrieve words. The patient's Verbal Memory score is 80, with a percentile of 9, and falls within the Low Average range.      Visual?Memory: Measures how well subject can recognize, remember and retrieve geometric figures. The patient's Visual Memory score is 93, with a percentile of 32, and falls within the Average range.      Psychomotor?Speed: Measures how well a subject perceives, attends, responds to complex visual-perceptual information and performs simple fine motor coordination, and is comprised of the Motor Speed and Processing Speed indexes. The patient's Psychomotor Speed score is 95, with a percentile of 37, and falls within the Average range.      Reaction?Time: Measures how quickly the subject can react, in milliseconds, to a simple and increasingly complex direction set. The patient's Reaction Time score is 70, with a percentile of 2, and falls within the Low range.      Complex?Attention: Measures the ability to track and respond to a variety of stimuli over lengthy periods of time and/or perform complex mental tasks requiring vigilance quickly and accurately. The patient's Complex Attention score is 83, with a percentile of 13, and falls within the Low Average range.      Cognitive?Flexibility: Measures how well subject is able to adapt to rapidly changing and increasingly complex set of directions and/or to manipulate the information. The patient's Cognitive Flexibility score is 81, with a percentile of 10, and falls within the Low Average range.      Processing?Speed: Measures how well a subject recognizes and processes information i.e., perceiving, attending/responding to incoming information, motor speed, fine motor coordination, and visual-perceptual ability. The patient's Processing Speed score is 68, with a percentile of 2, and falls within the Very Low range.      Executive?Function: Measures how well a subject recognizes rules, categories, and manages or  navigates rapid decision making. The patient's Executive Function score is 82, with a percentile of 12, and falls within the Low Average range.      Simple?Attention: Measures the ability to track and respond to a single defined stimulus over lengthy periods of time while performing vigilance and response inhibition quickly and accurately to a simple task. The patient's Simple Attention score is 91, with a percentile of 27, and falls within the Average range.      Motor?Speed: Measure: Ability to perform simple movements to produce and satisfy an intention towards a manual action and goal. The patient's Motor Speed score is 112, with a percentile of 79, and falls within the Above Average range.     ?   Summary of [place] Multiphasic Personality Inventory--Second Edition    Client completed the [place] Multiphasic Personality Inventory-2 (MMPI-2), a self-report personality inventory, as part of his evaluation. Validity scales indicate that the client responded in an open and consistent manner, resulting in a valid profile. While overreporting is possible, it is also likely that the Client has limited resources for coping with the stresses and demands of daily life. The following results should be interpreted with caution and in light of other information. His profile reflects a high level of emotional distress. Individuals with similar profiles experience depression with tension, anxiety, worry, intrusive thoughts, insecurity, and apprehensiveness. They experience disturbed sleep and problems with attention and concentration. They feel nervous, stressed out, and vulnerable to upset by disappointment and decisions that don't work out. Individuals with similar profiles may ruminate about personal shortcomings, over-anticipate negative outcomes, and overreact to minor problems and mistakes. Individuals with similar profiles may experience self-doubt, reduced occupational performance, problems with concentration,  memory, judgment, and decision making. They may also experience vegetative symptoms of depression such as anhedonia, sleep disturbance, and loss of interest, energy and motivation. They feel less capable, less competent, less adequate and less intelligent than others. They may experience a sense of mental failure or decline and the depletion of energy needed to accomplish mental work. Thinking and problem-solving are experienced as effortful and as subject to going off course even when significant effort is made. Thinking may be viewed as impaired or unreliable; they may have a sense that  I can't seem to get my mind right.  Individuals with similar profiles are concerned about their general health status and physical functioning and have a tendency to develop physical symptoms in response to stress.       Generalized Anxiety Disorder Questionnaire (TAMIE-7)  This questionnaire is designed to screen for anxiety in adults. Based on the client's score of 10, they are reporting moderate symptoms of anxiety at this time. Client identified the following symptoms of anxiety: feeling nervous/anxious/on edge; not being able to stop or control worrying; worrying too much about different things; becoming easily annoyed or irritable; and feeling afraid as if something awful might happen.     Patient Health Questionnaire- 9 (PHQ-9)   This questionnaire is designed to screen for depression in adults. Based on the client's score of 12, they are reporting moderate symptoms of depression at this time. Client identified the following symptoms of depression: little interest or pleasure in doing things; feeling down/depressed/hopeless; trouble falling asleep/staying asleep/sleeping too much; feeling tired or having little energy; poor appetite or overeating; feeling bad about self; poor concentration, and feeling fidgety/restless.   ?   Summary (based on clinical interview, review of records, test results):   Client is a 33-year-old,  ", partnered / significant other male. Client was referred for a diagnostic assessment by PCP. The purpose of this evaluation is to: provide treatment recommendations and clarify diagnosis. Client's presenting concerns include: He is fidgety and bites his nails. He can't stay focused in conversations. He is pretty organized. He can be forgetful so he has to make lists. He has to ask people to repeat themselves. It can be hard to listen. He has been told that he interrupts people (he doesn't notice this).  He likes to be active and busy. It is hard to sit still and relax at times (he likes to be \"go, go, go\" at work). Client starts a lot of things and bounces around from one thing to the next without finishing things (this happens at home). He will get side-tracked. He feels easily distracted. He usually has his phone out while watching TV. He will get up and move around too much. Client stated that symptoms have resulted in the following functional impairments: academic performance, management of the household and or completion of tasks and social interactions. Client reported that he has not completed a previous ADHD diagnostic assessment. Client has received a previous diagnosis of 16. Client reported that medication has been prescribed to address these problems. Client reported that medication was helpful and did not cause unpleasant side effects. He briefly took medication for ADHD but insurance changed and stopped covering it. He only took it for a year. He remembered it being helpful. Client reported that medication has been prescribed to address these problems. Client reported that medication was helpful and did not cause unpleasant side effects. Client reported that these problems began in childhood. Other people have told him he seems anxious at times, but it doesn't bother him. Client has attempted to resolve these concerns in the past through medication management. He tried medication in 9th grade " "briefly. Client reported that other professionals are not involved in providing support / services. He might feel depressed in chacko when he is laid off from working. Work can be hectic, and he can feel stressed or anxious at times. He did not report a personal history of chemical dependence.    Client reported he grew up in [place], [place]. He was raised by his biological parents. His parents were always together. He was the first born of two children. He has a younger sister. Client reported that their childhood was \"pretty good.\" His family got along well. He always went to school. Client described their current relationships with family of origin as close. Client reported the following relationship history: a few dating relationships. Client's current relationship status is dating for a few months. They get along well. Client identified their sexual orientation as heterosexual. He does not have children. Client identified parents as part of their support system. Client identified the quality of these relationships as fair.    As a child, client reported that he failed to complete assigned chores in the home environment, forgot schoolwork or other items between home and school and needed frequent reminders by parents to be motivated or to complete work. Client reported no difficulty with childhood peer relationships. As a child, client reported having regular and consistent sleep patterns.  Client reported currently experiencing sleep disturbance, including: waking up in the night (tossing and turning due to back pain). Client reported sleeping approximately 6-7 hours per night.  Client reported that he has not completed a sleep study.     Client's highest education level was high school graduate. Client graduated high school in 2010. He estimated he obtained mostly Cs. During the elementary, middle, and high school years, Client recalls academic strengths in the area of \"hands on\" activities and shop class. " "Client reported experiencing academic problems in reading, writing and math. Client did identify the following learning problems: attention and concentration. He always had difficulty focusing. He was able to read, but he would read a paragraph and not remember what he had read (he was too focused on the words and missed the content). Client did receive tutoring services during the school years. Client did receive special education services. He was able to take tests individually in a separate room. He was given extended time on tests. Client reported failure to finish or complete homework. He was able to get help with getting his homework done at school so that he didn't have to bring anything home. He was often daydreaming in class. It was difficult to pay attention. Client did attend post-secondary school. He went to [employer]. He was there for under two years. He stated, \"I gave up because I placed so low that I was basically paying out of pocket for high school classes.\" He obtained mostly Cs in college.      Client reported that he is currently employed full-time. They get laid off in the chacko. 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 He has been at his job for 7 years. He pushes dirt and wants things to be perfect. Client reported that the current job is a good fit for his skills and personality. Client reported that he frequently made mistakes with poor attention to detail, often felt bored, disorganized behavior and distractible behavior. He can lose track of time talking with other co-workers. The client's work history includes: [employer] after high school (2 years). The longest period of employment has been 7 years. Client has not been terminated from a place of employment.   Results of testing were indicative of ADHD. Client did not endorse significant symptoms of ADHD currently or in childhood on the rating scales. However, his report during the clinical interview was suggestive of such " symptoms. Indeed, Client has a childhood diagnosis of ADHD (at age 16). The collateral report (mother) was discrepant; she noted considerably more symptoms of inattention currently and in childhood. Client's self-report measures suggest they are experiencing moderate anxiety and moderate depression at this time. Personality testing was positive for anxiety, depression, problems with attention and concentration. Client completed a brief virtual assessment examining brief core brain function domains. Results of this assessment demonstrated that, overall, Client's scores fell between the Average and Low Average ranges. Client performed in the Low Average range on the indices of Complex Attention, Cognitive Flexibility, and Executive Function. He performed in the Very Low range on the index of Processing Speed. On a test of processing speed, client made errors which could be due to impulsive responding, misperception or confusion. On a test assessing how well a subject is able to adapt to rapidly changing and increasingly complex set of directions, Client's prolonged reaction times indicate cognitive slowing and suggest difficulties with simple and complex attention. Client's performance on a test measuring executive function and how subjects have to adjust their responses to randomly changing rules indicate attentional dysfunction.     Based on the results of clinical interview and psychological testing, the client meets criteria for a diagnosis of ADHD Predominantly Inattentive Presentation and Generalized Anxiety Disorder. Client will be provided with the results of testing, diagnosis, and recommendations in his last appointment.       DSM5 Diagnoses: (Sustained by DSM5 Criteria Listed Above)      ADHD Predominantly Inattentive Presentation (F90.0)     Generalized Anxiety Disorder (F41.1)    Psychosocial & Contextual Factors: history of ADHD; work stress       Recommendations:      1. Schedule a medication evaluation  with your physician. Medications are often beneficial in treating anxiety symptoms as well as ADHD. It will be important that each condition is treated, as treatment for one without the other may lead to an increase in symptoms (e.g., treating ADHD, but not anxiety, can lead to increased anxiety).?   ??   2. Access resources through websites, books, and articles such as those provided in the Adult ADHD Symptom Management handout. ??   ??   3. Consider working with an ADHD  or individual therapist to learn skills to?assist with symptom management, as well as ways to improve relationships, etc. that may have been impacted by your symptoms.?   ??   4. Individual therapy is recommended. Therapies focused on identifying and challenging problematic thought and behavior patterns while increasing the use of healthy coping skills has been found to be effective in treating anxiety. It will be important to set goals in this therapy and work actively toward achieving short-term successes that lead to the completion of each goal. Action-oriented therapies, such as CBT and ACT (Acceptance and Commitment Therapy) are particularly recommended for the treatment of chronic anxiety.  ?   5. The use of behavioral strategies such as diaphragmatic breathing, guided imagery, and mindfulness is often helpful in the management of anxiety and depressive symptoms.      6. ?The following compensatory strategies may be useful to cope with reported inattention symptoms:    a. Maintaining a predictable routine and structured environment that incorporates prioritized checklists and reminders (e.g., Post-Its).   b. When completing tasks, try to focus on one task at a time and complete it in its entirety before moving on to the next task.   c. Minimize background distractions when working on complex tasks. For example, TV, radio or ongoing conversations in the background may hinder ability to focus on the task at hand.   d. Take regular breaks  from tasks that require prolonged attention. In general, regular breaks from complex tasks can help prevent lapses in attention, which can result in errors.   e. Outline the steps required to complete a task prior to beginning it, which can help ensure an organized approach. Use the outline to refer to throughout the task as a reminder of the steps to be completed.      Serenity Menjivar, PhD, LP   Licensed Psychologist

## 2024-02-07 ENCOUNTER — VIRTUAL VISIT (OUTPATIENT)
Dept: PSYCHOLOGY | Facility: CLINIC | Age: 34
End: 2024-02-07
Payer: COMMERCIAL

## 2024-02-07 DIAGNOSIS — F41.1 GENERALIZED ANXIETY DISORDER: Primary | ICD-10-CM

## 2024-02-07 DIAGNOSIS — F90.0 ATTENTION DEFICIT HYPERACTIVITY DISORDER, INATTENTIVE TYPE: ICD-10-CM

## 2024-02-07 PROCEDURE — 96130 PSYCL TST EVAL PHYS/QHP 1ST: CPT | Mod: 95 | Performed by: PSYCHOLOGIST

## 2024-02-07 PROCEDURE — 96131 PSYCL TST EVAL PHYS/QHP EA: CPT | Mod: 95 | Performed by: PSYCHOLOGIST

## 2024-02-07 NOTE — PROGRESS NOTES
Client Name: Que Justin Date: 2//24     Service Type: Individual (ADHD Evaluation feedback session)   ?   Session Start Time: 8:45 Session End Time: 9:05     ?   Session Length: 20 minutes    ?   Session #: (feedback)   ?   Attendees: Client attended alone      Telemedicine Visit: The patient's condition can be safely assessed and treated via synchronous audio and visual telemedicine encounter.      Reason for Telemedicine Visit: Services only offered telehealth    Originating Site (Patient Location): Patient's home      Distant Location (provider location):  Off-site    Consent:  The patient/guardian has verbally consented to: the potential risks and benefits of telemedicine (video visit) versus in person care; bill my insurance or make self-payment for services provided; and responsibility for payment of non-covered services.     Mode of Communication:  Video Conference via [employer]    As the provider I attest to compliance with applicable laws and regulations related to telemedicine.     ?   ?   DATA   ?   ?   Treatment Objective(s) Addressed in This Session:    Provided feedback on ADHD evaluation. Reviewed test results in depth and answered client's questions. Client diagnosed with ADHD Predominantly Inattentive Presentation and Generalized Anxiety Disorder. Reviewed ADHD Coping Skills. This provider also completed full written report of evaluation, including integration of testing data, summary, and recommendations. Please see Documentation Only dated 2/1/24.   ?   Progress on / Status of Treatment Objective(s) / Homework:    Completed    ?   Intervention:   ADHD Evaluation feedback; Reviewed report (can be found in Documentation Only encounter dated 2/1/24); Client was appreciative of the feedback and expressed understanding of the diagnoses.    ?   ?   ASSESSMENT: Current Emotional / Mental Status (status of significant symptoms):   Risk status (Self / Other harm or suicidal ideation)   Client denies  current fears or concerns for personal safety.   Client denies current or recent suicidal ideation or behaviors.   Client denies current or recent homicidal ideation or behaviors.   Client denies current or recent self-injurious behavior or ideation.   Client denies other safety concerns.   A safety and risk management plan has not been developed at this time, however client was given the after-hours number / 911 should there be a change in any of these risk factors.   ?   Appearance: Appropriate   Eye Contact: Good   Psychomotor Behavior: Normal   Attitude: Cooperative    Orientation: All   Speech   Rate / Production: Normal    Volume: Normal    Mood: Normal   Affect: Appropriate    Thought Content: Clear    Thought Form: Coherent Logical    Insight: Good    ?   Medication Review:   Client is not currently prescribed psychiatric medications    Medication Compliance:   NA  ?   Changes in Health Issues:   None reported   ?   Chemical Use Review:   Substance Use: Chemical use reviewed, no active concerns identified    ?   Tobacco Use: No current tobacco use.    ?   Collateral Reports Completed:   Routed note to Care Team Member(s)   ?   PLAN: (Homework, other)   ?   Recommendations:      1. Schedule a medication evaluation with your physician. Medications are often beneficial in treating anxiety symptoms as well as ADHD. It will be important that each condition is treated, as treatment for one without the other may lead to an increase in symptoms (e.g., treating ADHD, but not anxiety, can lead to increased anxiety).?   ??   2. Access resources through websites, books, and articles such as those provided in the Adult ADHD Symptom Management handout. ??   ??   3. Consider working with an ADHD  or individual therapist to learn skills to?assist with symptom management, as well as ways to improve relationships, etc. that may have been impacted by your symptoms.?   ??   4. Individual therapy is recommended. Therapies  focused on identifying and challenging problematic thought and behavior patterns while increasing the use of healthy coping skills has been found to be effective in treating anxiety. It will be important to set goals in this therapy and work actively toward achieving short-term successes that lead to the completion of each goal. Action-oriented therapies, such as CBT and ACT (Acceptance and Commitment Therapy) are particularly recommended for the treatment of chronic anxiety.  ?   5. The use of behavioral strategies such as diaphragmatic breathing, guided imagery, and mindfulness is often helpful in the management of anxiety and depressive symptoms.      6. ?The following compensatory strategies may be useful to cope with reported inattention symptoms:    a. Maintaining a predictable routine and structured environment that incorporates prioritized checklists and reminders (e.g., Post-Its).   b. When completing tasks, try to focus on one task at a time and complete it in its entirety before moving on to the next task.   c. Minimize background distractions when working on complex tasks. For example, TV, radio or ongoing conversations in the background may hinder ability to focus on the task at hand.   d. Take regular breaks from tasks that require prolonged attention. In general, regular breaks from complex tasks can help prevent lapses in attention, which can result in errors.   e. Outline the steps required to complete a task prior to beginning it, which can help ensure an organized approach. Use the outline to refer to throughout the task as a reminder of the steps to be completed.      Serenity Menjivar, PhD,    Licensed Psychologist      Psychological Testing    Billing/Services Summary    ?    Testing Evaluation Services  Base: 75641   (1st 60 mins)  Add-on: 15086   (each addtl 60 mins)    Record Review and Clarify Referral Question    (7:20/7:40), (5/4/23)  20 minutes    Integration/Report Generation     (3:00/4:00), (5/17/23) - MMPI-2  (11:00/12:00), (1/2/24) - Lynn Scales  (12:00/1:00), (1/30/24) - CNS Vital Signs  (3:00/4:00), (2/1/24) - Report Writing  60 minutes  60 minutes  60 minutes  60 minutes   Interactive Feedback Session   (8:45/9:05), (2/7/24)  20 minutes    Total Time:  280 minutes (4 hours, 40 minutes)    Total Units:  1  4   ?    ?    Diagnosis(es): (ICD-10)   ADHD Predominantly Inattentive Presentation (F90.0)      Generalized Anxiety Disorder (F41.1)

## 2024-02-26 ENCOUNTER — VIRTUAL VISIT (OUTPATIENT)
Dept: FAMILY MEDICINE | Facility: CLINIC | Age: 34
End: 2024-02-26
Payer: COMMERCIAL

## 2024-02-26 DIAGNOSIS — F41.1 GENERALIZED ANXIETY DISORDER: ICD-10-CM

## 2024-02-26 DIAGNOSIS — F90.0 ADHD, PREDOMINANTLY INATTENTIVE TYPE: Primary | ICD-10-CM

## 2024-02-26 PROCEDURE — 99214 OFFICE O/P EST MOD 30 MIN: CPT | Mod: 95 | Performed by: STUDENT IN AN ORGANIZED HEALTH CARE EDUCATION/TRAINING PROGRAM

## 2024-02-26 PROCEDURE — 96127 BRIEF EMOTIONAL/BEHAV ASSMT: CPT | Mod: 95 | Performed by: STUDENT IN AN ORGANIZED HEALTH CARE EDUCATION/TRAINING PROGRAM

## 2024-02-26 ASSESSMENT — ANXIETY QUESTIONNAIRES
7. FEELING AFRAID AS IF SOMETHING AWFUL MIGHT HAPPEN: SEVERAL DAYS
2. NOT BEING ABLE TO STOP OR CONTROL WORRYING: NOT AT ALL
7. FEELING AFRAID AS IF SOMETHING AWFUL MIGHT HAPPEN: SEVERAL DAYS
8. IF YOU CHECKED OFF ANY PROBLEMS, HOW DIFFICULT HAVE THESE MADE IT FOR YOU TO DO YOUR WORK, TAKE CARE OF THINGS AT HOME, OR GET ALONG WITH OTHER PEOPLE?: SOMEWHAT DIFFICULT
IF YOU CHECKED OFF ANY PROBLEMS ON THIS QUESTIONNAIRE, HOW DIFFICULT HAVE THESE PROBLEMS MADE IT FOR YOU TO DO YOUR WORK, TAKE CARE OF THINGS AT HOME, OR GET ALONG WITH OTHER PEOPLE: SOMEWHAT DIFFICULT
GAD7 TOTAL SCORE: 6
1. FEELING NERVOUS, ANXIOUS, OR ON EDGE: SEVERAL DAYS
5. BEING SO RESTLESS THAT IT IS HARD TO SIT STILL: SEVERAL DAYS
6. BECOMING EASILY ANNOYED OR IRRITABLE: SEVERAL DAYS
4. TROUBLE RELAXING: SEVERAL DAYS
GAD7 TOTAL SCORE: 6
3. WORRYING TOO MUCH ABOUT DIFFERENT THINGS: SEVERAL DAYS

## 2024-02-26 ASSESSMENT — ENCOUNTER SYMPTOMS: NERVOUS/ANXIOUS: 1

## 2024-02-26 NOTE — PATIENT INSTRUCTIONS
Adam Grey,    Thank you for allowing [facility] to manage your care.    I ordered some  work, please go to the laboratory to get your laboratory studies.      For your convenience, test results are released as soon as they are available  Please allow 1-2 business days for me to send you a comment about your results.  If not done so, I encourage you to login into [employer] ([email or web address] to review your results in real time.     If you have any questions or concerns, please feel free to call us at [phone].    Sincerely,    Dr. Aguilar    Did you know?      You can schedule a video visit for follow-up appointments as well as future appointments for certain conditions.  Please see the below link.     [email or web address]    If you have not already done so,  I encourage you to sign up for [employer] ([email or web address]).  This will allow you to review your results, securely communicate with a provider, and schedule virtual visits as well.

## 2024-02-26 NOTE — PROGRESS NOTES
Que is a 33 year old who is being evaluated via a billable video visit.      How would you like to obtain your AVS? MyChart  If the video visit is dropped, the invitation should be resent by: Text to cell phone: [phone]  Will anyone else be joining your video visit? No          Assessment & Plan     ADHD, predominantly inattentive type  uncontrolled  Psychological testing result reviewed.  I will start patient on Adderall XL 10 mg after signing a controlled substance agreement and clean urine drug screen.  Patient is going to an appointment for the lab and to sign the CSA.  I will see him for blood pressure check 1 month after starting Adderall  - Urine Drug Screen Clinic; Future  The risks, benefits and treatment options of prescribed medications or other treatments have been discussed with the patient. The patient verbalized their understanding and should call or follow up if no improvement or if they develop further problems    Generalized anxiety  uncontrolled  Patient is not interested in treatment at this time. He would like his ADHD treated to see if it would improve his anxiety. We will re-assess after being on Adderall for a month                  Subjective   Que is a 33 year old, presenting for the following health issues:    Anxiety and Discuss ADHD      2/26/2024    10:50 AM   Additional Questions   Roomed by Patient completed echeck in via [employer]         Never Rarely Sometimes Often Very Often   1 How often do you have trouble wrapping up the final details of a project once the challenging parts have been done?   x     2 How often do you have difficulty getting things in order when you have to do a task that requires organization?  x      3 How often do you have problems remembering appointments or obligations?  x      4 When you have a task that requires a lot of thought, how often do you avoid or delay getting started?   x     5 How often do you fidget or squirm with your hands or feet when  you have to sit down for a long time?     x   6 How often do you feel overly active and compelled to do things, like you were driven by a motor?   x     7 How often do you make careless mistakes when you have to work on a boring or difficult project?   x     8 How often do you have difficulty keeping your attention when you are doing boring or repetitive work?    x    9 How often do you have difficulty concentrating on what people say to you, even when they are speaking to you directly?    x    10 How often do you misplace or have difficulty finding things at home or at work?  x      11 How often are you distracted by activity or noise around you?    x    12 How often do you leave your seat in meetings or other situations in which you are expected to remain seated?   x     13 How often do you feel restless or fidgety?   x     14 How often do you have difficulty unwinding and relaxing when you have time to yourself?  x      15 How often do you find yourself talking too much when you are in social situations?   x     16 When you're in a conversation, how often do you find yourself finishing the sentences of the people you are talking to, before they can finish them themselves?   x     17 How often do you have difficulty waiting your turn in situations when turn-taking is required?   x     18 How often do you interrupt others when they are busy?   x       There is no chest pain ,sob, dizziness or palpitation. Denies personal hx and family hx of cardiac disease or sudden cardiac death    Anxiety    History of Present Illness       Mental Health Follow-up:  Patient presents to follow-up on Anxiety.    Patient's anxiety since last visit has been:  No change  The patient is not having other symptoms associated with anxiety.  Any significant life events: No  Patient is not feeling anxious or having panic attacks.  Patient has no concerns about alcohol or drug use.    He eats 2-3 servings of fruits and vegetables daily.He  consumes 2 sweetened beverage(s) daily.He exercises with enough effort to increase his heart rate 10 to 19 minutes per day.  He exercises with enough effort to increase his heart rate 4 days per week.   He is taking medications regularly.     Social History     Tobacco Use    Smoking status: Every Day     Packs/day: 1     Types: Cigarettes    Smokeless tobacco: Current     Types: Chew    Tobacco comments:     1 pack every two weeks    Vaping Use    Vaping Use: Never used   Substance Use Topics    Alcohol use: Yes    Drug use: Never         5/3/2023     2:44 PM 2/26/2024    10:09 AM   TAMIE-7 SCORE   Total Score 14 (moderate anxiety) 6 (mild anxiety)   Total Score 14 6         5/3/2023     2:06 PM   PHQ   PHQ-9 Total Score 8   Q9: Thoughts of better off dead/self-harm past 2 weeks Not at all             Review of Systems  Constitutional, neuro, ENT, endocrine, pulmonary, cardiac, gastrointestinal, genitourinary, musculoskeletal, integument and psychiatric systems are negative, except as otherwise noted.      Objective           Vitals:  No vitals were obtained today due to virtual visit.    Physical Exam   GENERAL: alert and no distress  EYES: Eyes grossly normal to inspection.  No discharge or erythema, or obvious scleral/conjunctival abnormalities.  RESP: No audible wheeze, cough, or visible cyanosis.    SKIN: Visible skin clear. No significant rash, abnormal pigmentation or lesions.  PSYCH: Appropriate affect, tone, and pace of words          Video-Visit Details    Type of service:  Video Visit     Originating Location (pt. Location): Home    Distant Location (provider location):  On-site  Platform used for Video Visit: Erik  Signed Electronically by: Mary Kay Aguilar MD

## 2024-02-26 NOTE — LETTER
[facility]  02/26/24  Patient: Que Justin  YOB: 1990  Medical Record Number: [id number]                                                                                  Non-Opioid Controlled Substance Agreement    This is an agreement between you and your provider regarding safe and appropriate use of controlled substances prescribed by your care team. Controlled substances are?medicines that can cause physical and mental dependence (abuse).     There are strict laws about having and using these medicines. We here at [facility] are  committed to working with you in your efforts to get better. To support you in this work, we'll help you schedule regular office appointments for medicine refills. If we must cancel or change your appointment for any reason, we'll make sure you have enough medicine to last until your next appointment.     As a Provider, I will:   Listen carefully to your concerns while treating you with respect.   Recommend a treatment plan that I believe is in your best interest and may involve therapies other than medicine.    Talk with you often about the possible benefits and the risk of harm of any medicine that we prescribe for you.  Assess the safety of this medicine and check how well it works.    Provide a plan on how to taper (discontinue or go off) using this medicine if the decision is made to stop its use.      ::  As a Patient, I understand controlled substances:     Are prescribed by my care provider to help me function or work and manage my condition(s).?  Are strong medicines and can cause serious side effects.     Need to be taken exactly as prescribed.?Combining controlled substances with certain medicines or chemicals (such as illegal drugs, alcohol, sedatives, sleeping pills, and benzodiazepines) can be dangerous or even fatal.? If I stop taking my medicines suddenly, I may have severe withdrawal symptoms.     The risks, benefits, and  side effects of these medicine(s) were explained to me. I agree that:    I will take part in other treatments as advised by my care team. This may be psychiatry or counseling, physical therapy, behavioral therapy, group treatment or a referral to specialist.    I will keep all my appointments and understand this is part of the monitoring of controlled substances.?My care team may require an office visit for EVERY controlled substance refill. If I miss appointments or don t follow instructions, my care team may stop my medicine    I will take my medicines as prescribed. I will not change the dose or schedule unless my care team tells me to. There will be no refills if I run out early.      I may be asked to come to the clinic and complete a urine drug test or complete a pill count. If I don t give a urine sample or participate in a pill count, the care team may stop my medicine.    I will only receive controlled substance prescriptions from this clinic. If I am treated by another provider, I will tell them that I am taking controlled substances and that I have a treatment agreement with this provider. I will inform my [facility] care team within one business day if I am given a prescription for any controlled substance by another healthcare provider. My [facility] care team can contact other providers and pharmacists about my use of any medicines.    It is up to me to make sure that I don't run out of my medicines on weekends or holidays.?If my care team is willing to refill my prescription without a visit, I must request refills only during office hours. Refills may take up to 3 business days to process. I will use one pharmacy to fill all my controlled substance prescriptions. I will notify the clinic about any changes to my insurance or medicine availability.    I am responsible for my prescriptions. If the medicine/prescription is lost, stolen or destroyed, it will not be replaced.?I also agree not  to share controlled substance medicines with anyone.     I am aware I should not use any illegal or recreational drugs. I agree not to drink alcohol unless my care team says I can.     If I enroll in the [place] Medical Cannabis program, I will tell my care team before my next refill.    I will tell my care team right away if I become pregnant, have a new medical problem treated outside of my regular clinic, or have a change in my medicines.     I understand that this medicine can affect my thinking, judgment and reaction time.? Alcohol and drugs affect the brain and body, which can affect the safety of my driving. Being under the influence of alcohol or drugs can affect my decision-making, behaviors, personal safety and the safety of others. Driving while impaired (DWI) can occur if a person is driving, operating or in physical control of a car, motorcycle, boat, snowmobile, ATV, motorbike, off-road vehicle or any other motor vehicle ([place] Statute 169A.20). I understand the risk if I choose to drive or operate any vehicle or machinery.    I understand that if I do not follow any of the conditions above, my prescriptions or treatment may be stopped or changed.   I agree that my provider, clinic care team and pharmacy may work with any city, state or federal law enforcement agency that investigates the misuse, sale or other diversion of my controlled medicine. I will allow my provider to discuss my care with, or share a copy of, this agreement with any other treating provider, pharmacy or emergency room where I receive care.     I have read this agreement and have asked questions about anything I did not understand.    ________________________________________________________  Patient Signature - Que Justin     ___________________                   Date     ________________________________________________________  Provider Signature - Mary Kay Aguilar MD       ___________________                   Date      ________________________________________________________  Witness Signature (required if provider not present while patient signing)          ___________________                   Date

## 2024-03-04 ENCOUNTER — LAB (OUTPATIENT)
Dept: LAB | Facility: CLINIC | Age: 34
End: 2024-03-04
Payer: COMMERCIAL

## 2024-03-04 DIAGNOSIS — F90.0 ADHD, PREDOMINANTLY INATTENTIVE TYPE: Primary | ICD-10-CM

## 2024-03-04 DIAGNOSIS — F90.0 ADHD, PREDOMINANTLY INATTENTIVE TYPE: ICD-10-CM

## 2024-03-04 LAB
AMPHETAMINES UR QL: NOT DETECTED
BARBITURATES UR QL SCN: NOT DETECTED
BENZODIAZ UR QL SCN: NOT DETECTED
BUPRENORPHINE UR QL: NOT DETECTED
CANNABINOIDS UR QL: DETECTED
COCAINE UR QL SCN: NOT DETECTED
D-METHAMPHET UR QL: NOT DETECTED
METHADONE UR QL SCN: NOT DETECTED
OPIATES UR QL SCN: NOT DETECTED
OXYCODONE UR QL SCN: NOT DETECTED
PCP UR QL SCN: NOT DETECTED
TRICYCLICS UR QL SCN: NOT DETECTED

## 2024-03-04 PROCEDURE — 80306 DRUG TEST PRSMV INSTRMNT: CPT

## 2024-03-04 RX ORDER — DEXTROAMPHETAMINE SACCHARATE, AMPHETAMINE ASPARTATE MONOHYDRATE, DEXTROAMPHETAMINE SULFATE AND AMPHETAMINE SULFATE 2.5; 2.5; 2.5; 2.5 MG/1; MG/1; MG/1; MG/1
10 CAPSULE, EXTENDED RELEASE ORAL DAILY
Qty: 30 CAPSULE | Refills: 0 | Status: SHIPPED | OUTPATIENT
Start: 2024-04-04 | End: 2024-05-04

## 2024-03-04 RX ORDER — DEXTROAMPHETAMINE SACCHARATE, AMPHETAMINE ASPARTATE MONOHYDRATE, DEXTROAMPHETAMINE SULFATE AND AMPHETAMINE SULFATE 2.5; 2.5; 2.5; 2.5 MG/1; MG/1; MG/1; MG/1
10 CAPSULE, EXTENDED RELEASE ORAL DAILY
Qty: 30 CAPSULE | Refills: 0 | Status: SHIPPED | OUTPATIENT
Start: 2024-05-05 | End: 2024-04-10 | Stop reason: DRUGHIGH

## 2024-03-04 RX ORDER — DEXTROAMPHETAMINE SACCHARATE, AMPHETAMINE ASPARTATE MONOHYDRATE, DEXTROAMPHETAMINE SULFATE AND AMPHETAMINE SULFATE 2.5; 2.5; 2.5; 2.5 MG/1; MG/1; MG/1; MG/1
10 CAPSULE, EXTENDED RELEASE ORAL DAILY
Qty: 30 CAPSULE | Refills: 0 | Status: SHIPPED | OUTPATIENT
Start: 2024-03-04 | End: 2024-04-03

## 2024-03-10 ENCOUNTER — HEALTH MAINTENANCE LETTER (OUTPATIENT)
Age: 34
End: 2024-03-10

## 2024-04-08 ENCOUNTER — MYC REFILL (OUTPATIENT)
Dept: FAMILY MEDICINE | Facility: CLINIC | Age: 34
End: 2024-04-08
Payer: COMMERCIAL

## 2024-04-08 DIAGNOSIS — F90.0 ADHD, PREDOMINANTLY INATTENTIVE TYPE: ICD-10-CM

## 2024-04-09 RX ORDER — DEXTROAMPHETAMINE SACCHARATE, AMPHETAMINE ASPARTATE MONOHYDRATE, DEXTROAMPHETAMINE SULFATE AND AMPHETAMINE SULFATE 2.5; 2.5; 2.5; 2.5 MG/1; MG/1; MG/1; MG/1
10 CAPSULE, EXTENDED RELEASE ORAL DAILY
Qty: 30 CAPSULE | Refills: 0 | OUTPATIENT
Start: 2024-04-09

## 2024-04-10 ENCOUNTER — VIRTUAL VISIT (OUTPATIENT)
Dept: FAMILY MEDICINE | Facility: CLINIC | Age: 34
End: 2024-04-10
Payer: COMMERCIAL

## 2024-04-10 DIAGNOSIS — F90.0 ADHD, PREDOMINANTLY INATTENTIVE TYPE: ICD-10-CM

## 2024-04-10 PROCEDURE — 99214 OFFICE O/P EST MOD 30 MIN: CPT | Mod: 95 | Performed by: STUDENT IN AN ORGANIZED HEALTH CARE EDUCATION/TRAINING PROGRAM

## 2024-04-10 RX ORDER — DEXTROAMPHETAMINE SACCHARATE, AMPHETAMINE ASPARTATE MONOHYDRATE, DEXTROAMPHETAMINE SULFATE AND AMPHETAMINE SULFATE 5; 5; 5; 5 MG/1; MG/1; MG/1; MG/1
20 CAPSULE, EXTENDED RELEASE ORAL DAILY
Qty: 30 CAPSULE | Refills: 0 | Status: SHIPPED | OUTPATIENT
Start: 2024-05-11 | End: 2024-06-10

## 2024-04-10 RX ORDER — DEXTROAMPHETAMINE SACCHARATE, AMPHETAMINE ASPARTATE MONOHYDRATE, DEXTROAMPHETAMINE SULFATE AND AMPHETAMINE SULFATE 5; 5; 5; 5 MG/1; MG/1; MG/1; MG/1
20 CAPSULE, EXTENDED RELEASE ORAL DAILY
Qty: 30 CAPSULE | Refills: 0 | Status: SHIPPED | OUTPATIENT
Start: 2024-04-10 | End: 2024-05-10

## 2024-04-10 RX ORDER — DEXTROAMPHETAMINE SACCHARATE, AMPHETAMINE ASPARTATE MONOHYDRATE, DEXTROAMPHETAMINE SULFATE AND AMPHETAMINE SULFATE 5; 5; 5; 5 MG/1; MG/1; MG/1; MG/1
20 CAPSULE, EXTENDED RELEASE ORAL DAILY
Qty: 30 CAPSULE | Refills: 0 | Status: SHIPPED | OUTPATIENT
Start: 2024-06-11 | End: 2024-07-11

## 2024-04-10 NOTE — PATIENT INSTRUCTIONS
Adam Grey,    Thank you for allowing [facility] to manage your care.    I sent your prescriptions to your pharmacy.        For your convenience, test results are released as soon as they are available  Please allow 1-2 business days for me to send you a comment about your results.  If not done so, I encourage you to login into [employer] ([email or web address] to review your results in real time.     If you have any questions or concerns, please feel free to call us at [phone].    Sincerely,    Dr. Aguilar    Did you know?      You can schedule a video visit for follow-up appointments as well as future appointments for certain conditions.  Please see the below link.     [email or web address]    If you have not already done so,  I encourage you to sign up for [email or web address] ([email or web address]).  This will allow you to review your results, securely communicate with a provider, and schedule virtual visits as well.

## 2024-04-10 NOTE — PROGRESS NOTES
Que is a 33 year old who is being evaluated via a billable video visit.    How would you like to obtain your AVS? MyChart  If the video visit is dropped, the invitation should be resent by: Text to cell phone: [phone]  Will anyone else be joining your video visit? No      Assessment & Plan     ADHD, predominantly inattentive type  He has observed some improvement, though it has not been sufficient. Therefore, we will escalate the dosage from 10 mg to 20 mg.  - amphetamine-dextroamphetamine (ADDERALL XR) 20 MG 24 hr capsule; Take 1 capsule (20 mg) by mouth daily for 30 days  - amphetamine-dextroamphetamine (ADDERALL XR) 20 MG 24 hr capsule; Take 1 capsule (20 mg) by mouth daily for 30 days  - amphetamine-dextroamphetamine (ADDERALL XR) 20 MG 24 hr capsule; Take 1 capsule (20 mg) by mouth daily for 30 days    Follow up 3-month          Subjective   Que is a 33 year old, presenting for the following health issues:  A.D.H.D      4/10/2024     5:24 PM   Additional Questions   Roomed by Renetta KAPLAN         4/10/2024     5:24 PM   Patient Reported Additional Medications   Patient reports taking the following new medications No new medications to add     HPI     Medication Followup of Adderall  Taking Medication as prescribed: yes  Side Effects:  None  Medication Helping Symptoms:  a little      Never Rarely Sometimes Often Very Often   1 How often do you have trouble wrapping up the final details of a project once the challenging parts have been done?   x     2 How often do you have difficulty getting things in order when you have to do a task that requires organization? x       3 How often do you have problems remembering appointments or obligations?     x   4 When you have a task that requires a lot of thought, how often do you avoid or delay getting started?     x   5 How often do you fidget or squirm with your hands or feet when you have to sit down for a long time?     x   6 How often do you feel overly active  and compelled to do things, like you were driven by a motor?   x     7 How often do you make careless mistakes when you have to work on a boring or difficult project?   x     8 How often do you have difficulty keeping your attention when you are doing boring or repetitive work?     x   9 How often do you have difficulty concentrating on what people say to you, even when they are speaking to you directly?     x   10 How often do you misplace or have difficulty finding things at home or at work?  x      11 How often are you distracted by activity or noise around you?     x   12 How often do you leave your seat in meetings or other situations in which you are expected to remain seated?  x      13 How often do you feel restless or fidgety?     x   14 How often do you have difficulty unwinding and relaxing when you have time to yourself?     x   15 How often do you find yourself talking too much when you are in social situations?   x     16 When you're in a conversation, how often do you find yourself finishing the sentences of the people you are talking to, before they can finish them themselves?     x   17 How often do you have difficulty waiting your turn in situations when turn-taking is required? x       18 How often do you interrupt others when they are busy?   x           Review of Systems  Constitutional, neuro, ENT, endocrine, pulmonary, cardiac, gastrointestinal, genitourinary, musculoskeletal, integument and psychiatric systems are negative, except as otherwise noted.      Objective           Vitals:  No vitals were obtained today due to virtual visit.    Physical Exam   GENERAL: alert and no distress  EYES: Eyes grossly normal to inspection.  No discharge or erythema, or obvious scleral/conjunctival abnormalities.  RESP: No audible wheeze, cough, or visible cyanosis.    SKIN: Visible skin clear. No significant rash, abnormal pigmentation or lesions.  PSYCH: Appropriate affect, tone, and pace of  words      Video-Visit Details    Type of service:  Video Visit   Originating Location (pt. Location): Home    Distant Location (provider location):  On-site  Platform used for Video Visit: Erik  Signed Electronically by: Mary Kay Aguilar MD

## 2024-08-12 ENCOUNTER — VIRTUAL VISIT (OUTPATIENT)
Dept: FAMILY MEDICINE | Facility: CLINIC | Age: 34
End: 2024-08-12
Attending: STUDENT IN AN ORGANIZED HEALTH CARE EDUCATION/TRAINING PROGRAM

## 2024-08-12 DIAGNOSIS — F90.0 ADHD, PREDOMINANTLY INATTENTIVE TYPE: Primary | ICD-10-CM

## 2024-08-12 DIAGNOSIS — Z79.899 CONTROLLED SUBSTANCE AGREEMENT SIGNED: ICD-10-CM

## 2024-08-12 PROCEDURE — 99213 OFFICE O/P EST LOW 20 MIN: CPT | Mod: 95 | Performed by: STUDENT IN AN ORGANIZED HEALTH CARE EDUCATION/TRAINING PROGRAM

## 2024-08-12 RX ORDER — DEXTROAMPHETAMINE SACCHARATE, AMPHETAMINE ASPARTATE MONOHYDRATE, DEXTROAMPHETAMINE SULFATE AND AMPHETAMINE SULFATE 5; 5; 5; 5 MG/1; MG/1; MG/1; MG/1
20 CAPSULE, EXTENDED RELEASE ORAL DAILY
Qty: 30 CAPSULE | Refills: 0 | Status: SHIPPED | OUTPATIENT
Start: 2024-10-11 | End: 2024-11-10

## 2024-08-12 RX ORDER — DEXTROAMPHETAMINE SACCHARATE, AMPHETAMINE ASPARTATE MONOHYDRATE, DEXTROAMPHETAMINE SULFATE AND AMPHETAMINE SULFATE 5; 5; 5; 5 MG/1; MG/1; MG/1; MG/1
20 CAPSULE, EXTENDED RELEASE ORAL DAILY
Qty: 30 CAPSULE | Refills: 0 | Status: SHIPPED | OUTPATIENT
Start: 2024-08-12 | End: 2024-09-11

## 2024-08-12 RX ORDER — DEXTROAMPHETAMINE SACCHARATE, AMPHETAMINE ASPARTATE MONOHYDRATE, DEXTROAMPHETAMINE SULFATE AND AMPHETAMINE SULFATE 5; 5; 5; 5 MG/1; MG/1; MG/1; MG/1
20 CAPSULE, EXTENDED RELEASE ORAL DAILY
Qty: 30 CAPSULE | Refills: 0 | Status: SHIPPED | OUTPATIENT
Start: 2024-09-11 | End: 2024-10-11

## 2024-08-12 NOTE — PATIENT INSTRUCTIONS
Adam Grey,    Thank you for allowing [facility] to manage your care.      I sent your prescriptions to your pharmacy.        For your convenience, test results are released as soon as they are available  Please allow 1-2 business days for me to send you a comment about your results.  If not done so, I encourage you to login into [employer] [email or web address] to review your results in real time.     If you have any questions or concerns, please feel free to call us at [phone].    Sincerely,    Dr. Aguilar    Did you know?      You can schedule a video visit for follow-up appointments as well as future appointments for certain conditions.  Please see the below link.     [email or web address]    If you have not already done so,  I encourage you to sign up for "1[employer]3 [employer]"t ([email or web address]).  This will allow you to review your results, securely communicate with a provider, and schedule virtual visits as well.

## 2024-08-12 NOTE — PROGRESS NOTES
Que is a 34 year old who is being evaluated via a billable video visit.    How would you like to obtain your AVS? [employer]  If the video visit is dropped, the invitation should be resent by: Text to cell phone: [phone]  Will anyone else be joining your video visit? No      Assessment & Plan     ADHD, predominantly inattentive type  Stable PDMP checked.  - amphetamine-dextroamphetamine (ADDERALL XR) 20 MG 24 hr capsule; Take 1 capsule (20 mg) by mouth daily for 30 days  - amphetamine-dextroamphetamine (ADDERALL XR) 20 MG 24 hr capsule; Take 1 capsule (20 mg) by mouth daily for 30 days  - amphetamine-dextroamphetamine (ADDERALL XR) 20 MG 24 hr capsule; Take 1 capsule (20 mg) by mouth daily for 30 days  Follow up in 3 months.  CSA signed                Subjective   Que is a 34 year old, presenting for the following health issues:  A.D.H.D      8/12/2024    11:25 AM   Additional Questions   Roomed by Patient completed echeck in via [employer]     A.D.H.D    History of Present Illness       Reason for visit:  Adhd    He eats 2-3 servings of fruits and vegetables daily.He consumes 1 sweetened beverage(s) daily.He exercises with enough effort to increase his heart rate 30 to 60 minutes per day.  He exercises with enough effort to increase his heart rate 5 days per week. He is missing 2 dose(s) of medications per week.  He is not taking prescribed medications regularly due to other.     There is no medication side effect. There is no chest pain ,sob, dizziness or palpitation. Denies personal hx and family hx of cardiac disease or sudden cardiac death          Review of Systems  Constitutional, HEENT, cardiovascular, pulmonary, gi and gu systems are negative, except as otherwise noted.      Objective           Vitals:  No vitals were obtained today due to virtual visit.    Physical Exam   GENERAL: alert and no distress  EYES: Eyes grossly normal to inspection.  No discharge or erythema, or obvious scleral/conjunctival  abnormalities.  RESP: No audible wheeze, cough, or visible cyanosis.    PSYCH: Appropriate affect, tone, and pace of words          Video-Visit Details    Type of service:  Video Visit   Originating Location (pt. Location): Home    Distant Location (provider location):  On-site  Platform used for Video Visit: Erik  Signed Electronically by: Mary Kay Aguilar MD

## 2024-11-23 ENCOUNTER — MYC REFILL (OUTPATIENT)
Dept: FAMILY MEDICINE | Facility: CLINIC | Age: 34
End: 2024-11-23

## 2024-11-23 DIAGNOSIS — F90.0 ADHD, PREDOMINANTLY INATTENTIVE TYPE: ICD-10-CM

## 2024-11-23 RX ORDER — DEXTROAMPHETAMINE SACCHARATE, AMPHETAMINE ASPARTATE MONOHYDRATE, DEXTROAMPHETAMINE SULFATE AND AMPHETAMINE SULFATE 5; 5; 5; 5 MG/1; MG/1; MG/1; MG/1
20 CAPSULE, EXTENDED RELEASE ORAL DAILY
Qty: 30 CAPSULE | Refills: 0 | Status: SHIPPED | OUTPATIENT
Start: 2024-11-23

## 2025-02-01 ENCOUNTER — MYC REFILL (OUTPATIENT)
Dept: FAMILY MEDICINE | Facility: CLINIC | Age: 35
End: 2025-02-01

## 2025-02-01 DIAGNOSIS — F90.0 ADHD, PREDOMINANTLY INATTENTIVE TYPE: ICD-10-CM

## 2025-02-03 RX ORDER — DEXTROAMPHETAMINE SACCHARATE, AMPHETAMINE ASPARTATE MONOHYDRATE, DEXTROAMPHETAMINE SULFATE AND AMPHETAMINE SULFATE 5; 5; 5; 5 MG/1; MG/1; MG/1; MG/1
20 CAPSULE, EXTENDED RELEASE ORAL DAILY
Qty: 30 CAPSULE | Refills: 0 | Status: SHIPPED | OUTPATIENT
Start: 2025-02-03

## 2025-03-16 ENCOUNTER — HEALTH MAINTENANCE LETTER (OUTPATIENT)
Age: 35
End: 2025-03-16

## 2025-04-02 ENCOUNTER — MYC REFILL (OUTPATIENT)
Dept: FAMILY MEDICINE | Facility: CLINIC | Age: 35
End: 2025-04-02

## 2025-04-02 DIAGNOSIS — F90.0 ADHD, PREDOMINANTLY INATTENTIVE TYPE: ICD-10-CM

## 2025-04-03 RX ORDER — DEXTROAMPHETAMINE SACCHARATE, AMPHETAMINE ASPARTATE MONOHYDRATE, DEXTROAMPHETAMINE SULFATE AND AMPHETAMINE SULFATE 5; 5; 5; 5 MG/1; MG/1; MG/1; MG/1
20 CAPSULE, EXTENDED RELEASE ORAL DAILY
Qty: 30 CAPSULE | Refills: 0 | OUTPATIENT
Start: 2025-04-03

## 2025-05-05 ENCOUNTER — OFFICE VISIT (OUTPATIENT)
Dept: FAMILY MEDICINE | Facility: CLINIC | Age: 35
End: 2025-05-05

## 2025-05-05 VITALS
RESPIRATION RATE: 16 BRPM | DIASTOLIC BLOOD PRESSURE: 78 MMHG | TEMPERATURE: 97.7 F | BODY MASS INDEX: 30.49 KG/M2 | WEIGHT: 213 LBS | HEIGHT: 70 IN | SYSTOLIC BLOOD PRESSURE: 114 MMHG | OXYGEN SATURATION: 96 % | HEART RATE: 80 BPM

## 2025-05-05 DIAGNOSIS — F90.0 ADHD, PREDOMINANTLY INATTENTIVE TYPE: ICD-10-CM

## 2025-05-05 DIAGNOSIS — E78.5 HYPERLIPIDEMIA LDL GOAL <100: ICD-10-CM

## 2025-05-05 DIAGNOSIS — Z76.89 ESTABLISHING CARE WITH NEW DOCTOR, ENCOUNTER FOR: Primary | ICD-10-CM

## 2025-05-05 PROCEDURE — 99213 OFFICE O/P EST LOW 20 MIN: CPT | Performed by: STUDENT IN AN ORGANIZED HEALTH CARE EDUCATION/TRAINING PROGRAM

## 2025-05-05 RX ORDER — DEXTROAMPHETAMINE SACCHARATE, AMPHETAMINE ASPARTATE MONOHYDRATE, DEXTROAMPHETAMINE SULFATE AND AMPHETAMINE SULFATE 5; 5; 5; 5 MG/1; MG/1; MG/1; MG/1
20 CAPSULE, EXTENDED RELEASE ORAL DAILY
Qty: 30 CAPSULE | Refills: 0 | Status: SHIPPED | OUTPATIENT
Start: 2025-05-05 | End: 2025-06-04

## 2025-05-05 RX ORDER — DEXTROAMPHETAMINE SACCHARATE, AMPHETAMINE ASPARTATE MONOHYDRATE, DEXTROAMPHETAMINE SULFATE AND AMPHETAMINE SULFATE 5; 5; 5; 5 MG/1; MG/1; MG/1; MG/1
20 CAPSULE, EXTENDED RELEASE ORAL DAILY
Qty: 30 CAPSULE | Refills: 0 | Status: SHIPPED | OUTPATIENT
Start: 2025-06-04 | End: 2025-07-04

## 2025-05-05 RX ORDER — DEXTROAMPHETAMINE SACCHARATE, AMPHETAMINE ASPARTATE MONOHYDRATE, DEXTROAMPHETAMINE SULFATE AND AMPHETAMINE SULFATE 5; 5; 5; 5 MG/1; MG/1; MG/1; MG/1
20 CAPSULE, EXTENDED RELEASE ORAL DAILY
Qty: 30 CAPSULE | Refills: 0 | Status: SHIPPED | OUTPATIENT
Start: 2025-07-04 | End: 2025-08-03

## 2025-05-05 ASSESSMENT — PAIN SCALES - GENERAL: PAINLEVEL_OUTOF10: NO PAIN (0)

## 2025-05-05 NOTE — PROGRESS NOTES
"  Assessment & Plan     Establishing care with new doctor, encounter for  Patient is a pleasant 35 year old who presents today to establish care and to get refills of adderall. In lab review, appears to have history of hyperlipidemia. Did have DOMINIQUE with acute intoxication about 2 years ago. Would recommend BMP and lipid on follow up.    ADHD, predominantly inattentive type  Has been stable for years. Will have him complete UDS. CSA completed today. Follow up 6 months. Will send refill in the interim pending UDS results.   - Urine Drug Screen Clinic  - amphetamine-dextroamphetamine (ADDERALL XR) 20 MG 24 hr capsule  Dispense: 30 capsule; Refill: 0  - amphetamine-dextroamphetamine (ADDERALL XR) 20 MG 24 hr capsule  Dispense: 30 capsule; Refill: 0  - amphetamine-dextroamphetamine (ADDERALL XR) 20 MG 24 hr capsule  Dispense: 30 capsule; Refill: 0  - PRIMARY CARE FOLLOW-UP SCHEDULING  - PRIMARY CARE FOLLOW-UP SCHEDULING        BMI  Estimated body mass index is 30.56 kg/m  as calculated from the following:    Height as of this encounter: 1.778 m (5' 10\").    Weight as of this encounter: 96.6 kg (213 lb).       Malinda Grey is a 35 year old, presenting for the following health issues:  A.D.H.D and Establish Care        5/5/2025     6:55 AM   Additional Questions   Roomed by Sherlyn KILGORE   Accompanied by Self     History of Present Illness       Reason for visit:  Ask about my prescription refill   He is taking medications regularly.      Medication Followup of Adderall  Taking Medication as prescribed: yes  Side Effects:  None  Medication Helping Symptoms:  yes    Last rx 20 rxr adderall.     Review of Systems  Constitutional, HEENT, cardiovascular, pulmonary, gi and gu systems are negative, except as otherwise noted.      Objective    /78 (BP Location: Right arm, Patient Position: Sitting, Cuff Size: Adult Large)   Pulse 80   Temp 97.7  F (36.5  C) (Tympanic)   Resp 16   Ht 1.778 m (5' 10\")   Wt 96.6 kg (213 " lb)   SpO2 96%   BMI 30.56 kg/m    Body mass index is 30.56 kg/m .  Physical Exam  Constitutional:       Appearance: Normal appearance.   HENT:      Head: Normocephalic.   Eyes:      General: No scleral icterus.     Extraocular Movements: Extraocular movements intact.      Conjunctiva/sclera: Conjunctivae normal.   Cardiovascular:      Rate and Rhythm: Normal rate.   Pulmonary:      Effort: Pulmonary effort is normal.   Neurological:      General: No focal deficit present.      Mental Status: He is alert and oriented to person, place, and time.           Results from this visitNo results found for any visits on 05/05/25.          Signed Electronically by: Ling Carroll MD

## 2025-05-05 NOTE — LETTER
[facility]  05/05/25  Patient: Que Justin  YOB: 1990  Medical Record Number: [id number]                                                                                  Non-Opioid Controlled Substance Agreement    This is an agreement between you and your provider regarding safe and appropriate use of controlled substances prescribed by your care team. Controlled substances are?medicines that can cause physical and mental dependence (abuse).     There are strict laws about having and using these medicines. We here at [facility] are  committed to working with you in your efforts to get better. To support you in this work, we'll help you schedule regular office appointments for medicine refills. If we must cancel or change your appointment for any reason, we'll make sure you have enough medicine to last until your next appointment.     As a Provider, I will:   Listen carefully to your concerns while treating you with respect.   Recommend a treatment plan that I believe is in your best interest and may involve therapies other than medicine.    Talk with you often about the possible benefits and the risk of harm of any medicine that we prescribe for you.  Assess the safety of this medicine and check how well it works.    Provide a plan on how to taper (discontinue or go off) using this medicine if the decision is made to stop its use.      ::  As a Patient, I understand controlled substances:     Are prescribed by my care provider to help me function or work and manage my condition(s).?  Are strong medicines and can cause serious side effects.     Need to be taken exactly as prescribed.?Combining controlled substances with certain medicines or chemicals (such as illegal drugs, alcohol, sedatives, sleeping pills, and benzodiazepines) can be dangerous or even fatal.? If I stop taking my medicines suddenly, I may have severe withdrawal symptoms.     The risks, benefits,  and side effects of these medicine(s) were explained to me. I agree that:    I will take part in other treatments as advised by my care team. This may be psychiatry or counseling, physical therapy, behavioral therapy, group treatment or a referral to specialist.    I will keep all my appointments and understand this is part of the monitoring of controlled substances.?My care team may require an office visit for EVERY controlled substance refill. If I miss appointments or don t follow instructions, my care team may stop my medicine    I will take my medicines as prescribed. I will not change the dose or schedule unless my care team tells me to. There will be no refills if I run out early.      I may be asked to come to the clinic and complete a urine drug test or complete a pill count. If I don t give a urine sample or participate in a pill count, the care team may stop my medicine.    I will only receive controlled substance prescriptions from this clinic. If I am treated by another provider, I will tell them that I am taking controlled substances and that I have a treatment agreement with this provider. I will inform my [facility] care team within one business day if I am given a prescription for any controlled substance by another healthcare provider. My [facility] care team can contact other providers and pharmacists about my use of any medicines.    It is up to me to make sure that I don't run out of my medicines on weekends or holidays.?If my care team is willing to refill my prescription without a visit, I must request refills only during office hours. Refills may take up to 3 business days to process. I will use one pharmacy to fill all my controlled substance prescriptions. I will notify the clinic about any changes to my insurance or medicine availability.    I am responsible for my prescriptions. If the medicine/prescription is lost, stolen or destroyed, it will not be replaced.?I also agree  not to share controlled substance medicines with anyone.     I am aware I should not use any illegal or recreational drugs. I agree not to drink alcohol unless my care team says I can.     If I enroll in the [place] Medical Cannabis program, I will tell my care team before my next refill.    I will tell my care team right away if I become pregnant, have a new medical problem treated outside of my regular clinic, or have a change in my medicines.     I understand that this medicine can affect my thinking, judgment and reaction time.? Alcohol and drugs affect the brain and body, which can affect the safety of my driving. Being under the influence of alcohol or drugs can affect my decision-making, behaviors, personal safety and the safety of others. Driving while impaired (DWI) can occur if a person is driving, operating or in physical control of a car, motorcycle, boat, snowmobile, ATV, motorbike, off-road vehicle or any other motor vehicle ([place] Statute 169A.20). I understand the risk if I choose to drive or operate any vehicle or machinery.    I understand that if I do not follow any of the conditions above, my prescriptions or treatment may be stopped or changed.   I agree that my provider, clinic care team and pharmacy may work with any city, state or federal law enforcement agency that investigates the misuse, sale or other diversion of my controlled medicine. I will allow my provider to discuss my care with, or share a copy of, this agreement with any other treating provider, pharmacy or emergency room where I receive care.     I have read this agreement and have asked questions about anything I did not understand.    ________________________________________________________  Patient Signature - Que Justin     ___________________                   Date     ________________________________________________________  Provider Signature - Ling Carroll MD       ___________________                   Date      ________________________________________________________  Witness Signature (required if provider not present while patient signing)          ___________________                   Date

## 2025-07-14 ENCOUNTER — MYC MEDICAL ADVICE (OUTPATIENT)
Dept: FAMILY MEDICINE | Facility: CLINIC | Age: 35
End: 2025-07-14

## 2025-07-14 DIAGNOSIS — F90.0 ADHD, PREDOMINANTLY INATTENTIVE TYPE: Primary | ICD-10-CM

## 2025-07-14 RX ORDER — DEXTROAMPHETAMINE SACCHARATE, AMPHETAMINE ASPARTATE MONOHYDRATE, DEXTROAMPHETAMINE SULFATE AND AMPHETAMINE SULFATE 5; 5; 5; 5 MG/1; MG/1; MG/1; MG/1
20 CAPSULE, EXTENDED RELEASE ORAL DAILY
Qty: 30 CAPSULE | Refills: 0 | Status: SHIPPED | OUTPATIENT
Start: 2025-07-14 | End: 2025-08-13

## 2025-07-14 RX ORDER — DEXTROAMPHETAMINE SACCHARATE, AMPHETAMINE ASPARTATE MONOHYDRATE, DEXTROAMPHETAMINE SULFATE AND AMPHETAMINE SULFATE 5; 5; 5; 5 MG/1; MG/1; MG/1; MG/1
20 CAPSULE, EXTENDED RELEASE ORAL DAILY
Qty: 30 CAPSULE | Refills: 0 | Status: SHIPPED | OUTPATIENT
Start: 2025-08-13 | End: 2025-09-12

## 2025-07-14 RX ORDER — DEXTROAMPHETAMINE SACCHARATE, AMPHETAMINE ASPARTATE MONOHYDRATE, DEXTROAMPHETAMINE SULFATE AND AMPHETAMINE SULFATE 5; 5; 5; 5 MG/1; MG/1; MG/1; MG/1
20 CAPSULE, EXTENDED RELEASE ORAL DAILY
Qty: 30 CAPSULE | Refills: 0 | Status: SHIPPED | OUTPATIENT
Start: 2025-09-12 | End: 2025-10-12